# Patient Record
Sex: FEMALE | Race: BLACK OR AFRICAN AMERICAN | Employment: FULL TIME | URBAN - METROPOLITAN AREA
[De-identification: names, ages, dates, MRNs, and addresses within clinical notes are randomized per-mention and may not be internally consistent; named-entity substitution may affect disease eponyms.]

---

## 2017-02-21 ENCOUNTER — OFFICE VISIT (OUTPATIENT)
Dept: FAMILY MEDICINE CLINIC | Age: 57
End: 2017-02-21

## 2017-02-21 VITALS
SYSTOLIC BLOOD PRESSURE: 124 MMHG | HEIGHT: 65 IN | HEART RATE: 72 BPM | WEIGHT: 184 LBS | RESPIRATION RATE: 16 BRPM | OXYGEN SATURATION: 97 % | BODY MASS INDEX: 30.66 KG/M2 | TEMPERATURE: 98 F | DIASTOLIC BLOOD PRESSURE: 84 MMHG

## 2017-02-21 DIAGNOSIS — S20.212A CHEST WALL CONTUSION, LEFT, INITIAL ENCOUNTER: Primary | ICD-10-CM
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
## 2017-02-21 NOTE — MR AVS SNAPSHOT
Visit Information Date & Time Provider Department Dept. Phone Encounter #  
 2/21/2017  8:45 AM Joel Sampson MD [facility] [phone] [id number] Follow-up Instructions Return if symptoms worsen or fail to improve. Upcoming Health Maintenance Date Due  
 PAP AKA CERVICAL CYTOLOGY 6/11/2017 DTaP/Tdap/Td series (1 - Tdap) 5/22/2017* BREAST CANCER SCRN MAMMOGRAM 5/22/2017* COLONOSCOPY 7/26/2022 *Topic was postponed. The date shown is not the original due date. Allergies as of 2/21/2017  Review Complete On: 2/21/2017 By: Tata Escobar RN Severity Noted Reaction Type Reactions Enalapril  04/05/2012    Cough Procardia [Nifedipine]  04/05/2012    Other (comments)  
 headache Current Immunizations  Reviewed on 4/4/2012 Name Date  
 TD Vaccine 5/19/2008 Not reviewed this visit You Were Diagnosed With   
  
 Codes Comments Chest wall contusion, left, initial encounter    -  Primary ICD-10-CM: B03.920T ICD-9-CM: 922.1 Vitals BP Pulse Temp Resp Height(growth percentile) Weight(growth percentile) 124/84 (BP 1 Location: Left arm, BP Patient Position: Sitting) 72 98 °F (36.7 °C) (Oral) 16 5' 4.57\" (1.64 m) 184 lb (83.5 kg) SpO2 BMI OB Status Smoking Status 97% 31.03 kg/m2 Hysterectomy Never Smoker Vitals History BMI and BSA Data Body Mass Index Body Surface Area 31.03 kg/m 2 1.95 m 2 Preferred Pharmacy Pharmacy Name Phone [id number] [place] [phone] Your Updated Medication List  
  
   
This list is accurate as of: 2/21/17  9:06 AM.  Always use your most recent med list.  
  
  
  
  
 atorvastatin 20 mg tablet Commonly known as:  LIPITOR  
TAKE 1 TAB BY MOUTH DAILY. fish oil-omega-3 fatty acids 340-1,000 mg capsule Take 1 Cap by mouth daily. losartan-hydroCHLOROthiazide 50-12.5 mg per tablet Commonly known as:  HYZAAR  
TAKE 1 TABLET BY MOUTH EVERY DAY  
  
 MULTIPLE VITAMIN PO Take  by mouth. Follow-up Instructions Return if symptoms worsen or fail to improve. Introducing [facility] SERVICES! [place] introduces She[employer]ertz patient portal. Now you can access parts of your medical record, email your doctor's office, and request medication refills online. 1. In your internet browser, go to [email or web address]://[email or web address] 2. Click on the First Time User? Click Here link in the Sign In box. You will see the New Member Sign Up page. 3. Enter your ShepHertz Access Code exactly as it appears below. You will not need to use this code after youve completed the sign-up process. If you do not sign up before the expiration date, you must request a new code. · ShepHertz Access Code: [email or web address] Expires: 5/22/2017  9:06 AM 
 
4. Enter the last four digits of your Social Security Number (xxxx) and Date of Birth (mm/dd/yyyy) as indicated and click Submit. You will be taken to the next sign-up page. 5. Create a ShepHertz ID. This will be your ShepHertz login ID and cannot be changed, so think of one that is secure and easy to remember. 6. Create a ShepHertz password. You can change your password at any time. 7. Enter your Password Reset Question and Answer. This can be used at a later time if you forget your password. 8. Enter your e-mail address. You will receive e-mail notification when new information is available in [address] 9. Click Sign Up. You can now view and download portions of your medical record. 10. Click the Download Summary menu link to download a portable copy of your medical information. If you have questions, please visit the Frequently Asked Questions section of the ShepHertz website. Remember, ShepHertz is NOT to be used for urgent needs. For medical emergencies, dial 911. Now available from your iPhone and Android! Please provide this summary of care documentation to your next provider. Your primary care clinician is listed as [id number] If you have any questions after today's visit, please call [phone].

## 2017-03-15 DIAGNOSIS — I10 ESSENTIAL HYPERTENSION WITH GOAL BLOOD PRESSURE LESS THAN 140/90: ICD-10-CM

## 2017-03-15 RX ORDER — LOSARTAN POTASSIUM AND HYDROCHLOROTHIAZIDE 12.5; 5 MG/1; MG/1
TABLET ORAL
Qty: 90 TAB | Refills: 1 | Status: SHIPPED | OUTPATIENT
Start: 2017-03-15 | End: 2017-09-12 | Stop reason: SDUPTHER

## 2017-03-15 NOTE — TELEPHONE ENCOUNTER
She was in the office for wellness visit in June and her lab is current until September.  She can set her annual visit in August and get everything back on same schedule

## 2017-04-19 ENCOUNTER — TELEPHONE (OUTPATIENT)
Dept: FAMILY MEDICINE CLINIC | Age: 57
End: 2017-04-19

## 2017-04-19 NOTE — TELEPHONE ENCOUNTER
Patient sent in a fax today with a note that said she had been feeling a little dizzy with a headache yesterday while in a meeting. She had the nurse at work check her blood sugar and blood pressure  Blood sugar was 129 and her blood pressure was 99/67 yesterday and 123/78 today. She is concerned about the fluctuation in the readings.

## 2017-04-19 NOTE — TELEPHONE ENCOUNTER
Spoke with ID verified patient and she will monitor. She has been having headaches this week-not sure if allergy related. Will set up appt if not quickly better.  Advised Excedrin Migraine for the headaches or the components as needed

## 2017-05-10 RX ORDER — ATORVASTATIN CALCIUM 20 MG/1
TABLET, FILM COATED ORAL
Qty: 90 TAB | Refills: 1 | Status: SHIPPED | OUTPATIENT
Start: 2017-05-10 | End: 2017-11-10 | Stop reason: SDUPTHER

## 2017-05-10 NOTE — TELEPHONE ENCOUNTER
Her lipids are current as of September- she is due in for her annual [facility] but it's ok to wait and do everything at one visit in [place] was in for acute visit end of Feb

## 2017-09-12 DIAGNOSIS — I10 ESSENTIAL HYPERTENSION WITH GOAL BLOOD PRESSURE LESS THAN 140/90: ICD-10-CM

## 2017-09-12 RX ORDER — LOSARTAN POTASSIUM AND HYDROCHLOROTHIAZIDE 12.5; 5 MG/1; MG/1
TABLET ORAL
Qty: 90 TAB | Refills: 0 | Status: SHIPPED | OUTPATIENT
Start: 2017-09-12 | End: 2017-12-12 | Stop reason: SDUPTHER

## 2017-09-20 ENCOUNTER — OFFICE VISIT (OUTPATIENT)
Dept: FAMILY MEDICINE CLINIC | Age: 57
End: 2017-09-20

## 2017-09-20 VITALS
HEART RATE: 84 BPM | HEIGHT: 65 IN | TEMPERATURE: 98 F | BODY MASS INDEX: 31.21 KG/M2 | DIASTOLIC BLOOD PRESSURE: 75 MMHG | SYSTOLIC BLOOD PRESSURE: 128 MMHG | OXYGEN SATURATION: 95 % | WEIGHT: 187.3 LBS | RESPIRATION RATE: 16 BRPM

## 2017-09-20 DIAGNOSIS — Z00.00 LABORATORY EXAM ORDERED AS PART OF ROUTINE GENERAL MEDICAL EXAMINATION: ICD-10-CM

## 2017-09-20 DIAGNOSIS — I10 ESSENTIAL HYPERTENSION: ICD-10-CM

## 2017-09-20 DIAGNOSIS — E78.00 HYPERCHOLESTEROLEMIA: ICD-10-CM

## 2017-09-20 DIAGNOSIS — Z00.00 PHYSICAL EXAM: Primary | ICD-10-CM

## 2017-09-20 DIAGNOSIS — E55.9 VITAMIN D DEFICIENCY: ICD-10-CM

## 2017-09-20 NOTE — MR AVS SNAPSHOT
Visit Information Date & Time Provider Department Dept. Phone Encounter #  
 9/20/2017 10:00 AM Ced Colón MD [facility] [phone] [id number] Follow-up Instructions Return in about 1 year (around 9/20/2018) for [facility], labs. Upcoming Health Maintenance Date Due  
 BREAST CANCER SCRN MAMMOGRAM 12/19/2017* PAP AKA CERVICAL CYTOLOGY 12/19/2017* DTaP/Tdap/Td series (1 - Tdap) 5/19/2018* COLONOSCOPY 7/26/2022 *Topic was postponed. The date shown is not the original due date. Allergies as of 9/20/2017  Review Complete On: 9/20/2017 By: Ced Colón MD  
  
 Severity Noted Reaction Type Reactions Enalapril  04/05/2012    Cough Procardia [Nifedipine]  04/05/2012    Other (comments)  
 headache Current Immunizations  Reviewed on 9/20/2017 Name Date  
 TD Vaccine 5/19/2008 Reviewed by Ady Upton RN on 9/20/2017 at 10:05 AM  
You Were Diagnosed With   
  
 Codes Comments Physical exam    -  Primary ICD-10-CM: Z00.00 ICD-9-CM: V70.9 Hypercholesterolemia     ICD-10-CM: E78.00 ICD-9-CM: 272.0 Vitamin D deficiency     ICD-10-CM: E55.9 ICD-9-CM: 268.9 Essential hypertension     ICD-10-CM: I10 
ICD-9-CM: 401.9 Laboratory exam ordered as part of routine general medical examination     ICD-10-CM: Z00.00 ICD-9-CM: V72.62 Vitals BP Pulse Temp Resp Height(growth percentile) Weight(growth percentile) 128/75 (BP 1 Location: Left arm, BP Patient Position: Sitting) 84 98 °F (36.7 °C) (Oral) 16 5' 4.75\" (1.645 m) 187 lb 4.8 oz (85 kg) SpO2 BMI OB Status Smoking Status 95% 31.41 kg/m2 Hysterectomy Never Smoker Vitals History BMI and BSA Data Body Mass Index Body Surface Area  
 31.41 kg/m 2 1.97 m 2 Preferred Pharmacy Pharmacy Name Phone [id number] [address] [phone] Your Updated Medication List  
  
   
 This list is accurate as of: 9/20/17 10:34 AM.  Always use your most recent med list.  
  
  
  
  
 atorvastatin 20 mg tablet Commonly known as:  LIPITOR  
TAKE 1 TABLET BY MOUTH DAILY  
  
 fish oil-omega-3 fatty acids 340-1,000 mg capsule Take 1 Cap by mouth daily. losartan-hydroCHLOROthiazide 50-12.5 mg per tablet Commonly known as:  HYZAAR  
TAKE 1 TABLET BY MOUTH EVERY DAY  
  
 MULTIPLE VITAMIN PO Take  by mouth. We Performed the Following CBC WITH AUTOMATED DIFF [71491 CPT(R)] LIPID PANEL [66749 CPT(R)] METABOLIC PANEL, COMPREHENSIVE [32690 CPT(R)] TSH 3RD GENERATION [25065 CPT(R)] URINALYSIS W/ RFLX MICROSCOPIC [87813 CPT(R)] VITAMIN D, 25 HYDROXY [id number] CPT(R)] Follow-up Instructions Return in about 1 year (around 9/20/2018) for [facility], [facility]. Introducing [facility] HE[facility] SERVICES! [facility] introduces Fannabee patient portal. Now you can access parts of your medical record, email your doctor's office, and request medication refills online. 1. In your internet browser, go to [email or web address] 2. Click on the First Time User? Click Here link in the Sign In box. You will see the New Member Sign Up page. 3. Enter your Fannabee Access Code exactly as it appears below. You will not need to use this code after youve completed the sign-up process. If you do not sign up before the expiration date, you must request a new code. · Fannabee Access Code: [email or web address] Expires: 12/19/2017 10:34 AM 
 
4. Enter the last four digits of your Social Security Number (xxxx) and Date of Birth (mm/dd/yyyy) as indicated and click Submit. You will be taken to the next sign-up page. 5. Create a Virallyt ID. This will be your Fannabee login ID and cannot be changed, so think of one that is secure and easy to remember. 6. Create a Fannabee password. You can change your password at any time. 7. Enter your Password Reset Question and Answer. This can be used at a later time if you forget your password. 8. Enter your e-mail address. You will receive e-mail notification when new information is available in [address] 9. Click Sign Up. You can now view and download portions of your medical record. 10. Click the Download Summary menu link to download a portable copy of your medical information. If you have questions, please visit the Frequently Asked Questions section of the [employer] website. Remember, [employer] is NOT to be used for urgent needs. For medical emergencies, dial 911. Now available from your iPhone and Android! Please provide this summary of care documentation to your next provider. Your primary care clinician is listed as [phone] If you have any questions after today's visit, please call [phone].

## 2017-09-20 NOTE — PROGRESS NOTES
Chief Complaint   Patient presents with    Complete Physical    Labs     Fasting   Lipitor was changed to every other day due to Leg pain. Pain has improved since changing med. 1. Have you been to the ER, urgent care clinic since your last visit? Hospitalized since your last visit? No    2. Have you seen or consulted any other health care providers outside of the [address] [place] [address] since your last visit? Include any pap smears or colon screening. No    The patient was counseled on the dangers of tobacco use, and Patient is a non smoker. Reviewed strategies to maximize success, including Continue not to smoke. I have reviewed Health Maintenance with the patient and updated. Patient has an Advance Directive. Complete Physical Exam Female  Pre-Visit Questions:    1. Do you follow a low fat or low salt diet ? y  2. Do you follow an exercise program? y  3. Have you had your tetanus booster in the last 10 years? y  3. Have you ever had a Pneumonia vaccine? n  5. Do you smoke? n  6. Do you consider yourself overweight? y  7. Do you perform Breast self exam?n  8. Is there a family history of CAD< age 48? n  5. Is there a family history of Cancer? y  8. Do you have any Cancer risks? n  11. Have you had a colonoscopy? y  15. Have you been to your eye doctor past year?  y   15. Have you been to your dentist in the last 6 months?  y  15. Have you had your flu shot for this season?  n  13. Have you had a Pap smear in the last 3 years?y  16. Have you had your annual mammogram?y  17.   Have you had a bone density scan(DEXA)?y

## 2017-09-20 NOTE — PROGRESS NOTES
HISTORY OF PRESENT ILLNESS  Brian More is a 62 y.o. female. HPI   Here for [facility]. Eye doctor past yr. Dentist 2 x annually. Colonoscopy '12.  10 yr repeat. Gyn and mammo annually. DEXA 3 yrs ago. No signif hx past yr. Patient Active Problem List   Diagnosis Code    Essential hypertension I10    Vitamin D deficiency E55.9    Hypercholesterolemia E78.00     Current Outpatient Prescriptions   Medication Sig Dispense Refill    losartan-hydroCHLOROthiazide (HYZAAR) 50-12.5 mg per tablet TAKE 1 TABLET BY MOUTH EVERY DAY 90 Tab 0    atorvastatin (LIPITOR) 20 mg tablet TAKE 1 TABLET BY MOUTH DAILY 90 Tab 1    fish oil-omega-3 fatty acids 340-1,000 mg capsule Take 1 Cap by mouth daily.  MULTIVITAMIN (MULTIPLE VITAMIN PO) Take  by mouth.          Allergies   Allergen Reactions    Enalapril Cough    Procardia [Nifedipine] Other (comments)     headache     Past Medical History:   Diagnosis Date    Acid reflux     Advance directive discussed with patient 05/18/2015    Anemia NEC     Back pain 9/15/15    [place] [facility] notes    Cyst     History of chicken pox     Hypercholesterolemia     Hypertension     no treated at this time    Redundant colon 5/2012    on scope sameer frances    Screening for HPV (human papillomavirus) 1/16/15    Dr Josh Flores positive PAP repeat due 1/2016     Past Surgical History:   Procedure Laterality Date    CT HEART W/O CONT WITH CALCIUM  5/2008    zero    HX ADENOIDECTOMY      HX HYSTERECTOMY      Hyst for DUB    HX TONSILLECTOMY      t and a    ID COLONOSCOPY FLX DX W/COLLJ SPEC WHEN PFRMD  5/7/2012     with barium enema follow up redundant     Family History   Problem Relation Age of Onset    Cancer Father      lung    Hypertension Father     Elevated Lipids Father     Cancer Paternal Uncle      colon    Heart Disease Maternal Grandmother     Cancer Paternal Grandfather      lung    Cancer Paternal Uncle      colon    Heart Disease Mother     Hypertension Mother     Elevated Lipids Mother     Diabetes Sister     Hypertension Sister     Hypertension Brother     Elevated Lipids Brother     Cancer Paternal Aunt      stomach    Cancer Paternal Aunt      stomach    Cancer Paternal Aunt      stomach    Cancer Paternal Aunt      stomach    Lung Disease Paternal Uncle     Lung Disease Paternal Uncle     Other Sister      Social History   Substance Use Topics    Smoking status: Never Smoker    Smokeless tobacco: Never Used    Alcohol use No      Lab Results  Component Value Date/Time   WBC 5.8 09/07/2016 08:24 AM   HGB 11.9 09/07/2016 08:24 AM   HCT 36.7 09/07/2016 08:24 AM   PLATELET 175 04/29/7543 08:24 AM   MCV 82 09/07/2016 08:24 AM     Lab Results  Component Value Date/Time   Glucose 94 09/07/2016 08:24 AM   LDL, calculated 84 09/07/2016 08:24 AM   Creatinine 0.72 09/07/2016 08:24 AM      Lab Results  Component Value Date/Time   Cholesterol, total 148 09/07/2016 08:24 AM   HDL Cholesterol 48 09/07/2016 08:24 AM   LDL, calculated 84 09/07/2016 08:24 AM   Triglyceride 82 09/07/2016 08:24 AM   CHOL/HDL Ratio 4.5 07/09/2009 08:24 AM   Lab Results  Component Value Date/Time   ALT (SGPT) 32 09/07/2016 08:24 AM   AST (SGOT) 30 09/07/2016 08:24 AM   Alk.  phosphatase 91 09/07/2016 08:24 AM   Bilirubin, total 0.3 09/07/2016 08:24 AM   Albumin 4.2 09/07/2016 08:24 AM   Protein, total 7.2 09/07/2016 08:24 AM   PLATELET 403 33/33/0015 08:24 AM       Lab Results  Component Value Date/Time   GFR est non-AA 94 09/07/2016 08:24 AM   GFR est  09/07/2016 08:24 AM   Creatinine 0.72 09/07/2016 08:24 AM   BUN 15 09/07/2016 08:24 AM   Sodium 141 09/07/2016 08:24 AM   Potassium 4.0 09/07/2016 08:24 AM   Chloride 100 09/07/2016 08:24 AM   CO2 26 09/07/2016 08:24 AM   Lab Results  Component Value Date/Time   TSH 1.210 09/07/2016 08:24 AM      Lab Results   Component Value Date/Time    Glucose 94 09/07/2016 08:24 AM      Fasting for labs     Review of Systems   Constitutional: Negative. HENT: Negative. Eyes: Negative. Respiratory: Negative. Cardiovascular: Negative. Gastrointestinal: Negative. Genitourinary: Negative. Musculoskeletal: Positive for myalgias. Skin: Negative. Neurological: Negative. Endo/Heme/Allergies: Negative. Psychiatric/Behavioral: Negative. Physical Exam   Vitals:    09/20/17 0958   BP: 128/75   Pulse: 84   Resp: 16   Temp: 98 °F (36.7 °C)   TempSrc: Oral   SpO2: 95%   Weight: 187 lb 4.8 oz (85 kg)   Height: 5' 4.75\" (1.645 m)       General    alert, cooperative, no distress, appears stated age   Head    Normocephalic, without obvious abnormality, atraumatic   Eyes    conjunctivae/corneas clear. PERRL. Fundi benign   Ears    Canals and TMs clear   Nose Passages patent. Mucosa normal. No drainage or sinus tenderness. Throat Lips, mucosa, and tongue normal. Teeth and gums normal.  Post pharynx neg. Neck supple, nontender, no adenopathy, thyroid: not enlarged, no masses/nodules, no carotid bruits   Back     symmetric, no curvature. FROM. No CVA tenderness   Lungs     clear to auscultation bilaterally   Breasts    Declined   Heart    Regular rate and rhythm, with no murmur, click, rub or gallop   Abdomen   Soft, non-tender. Bowel sounds normal. No masses,  No organomegaly   Genitalia and Rectal  Deferred. Per GYN. Extremities   extremities normal, atraumatic, no cyanosis or edema   Pulses   1-2+ and symmetric except dec pedals. Skin No rashes or lesions       Neurologic Romberg neg, nml heel, toe and Tandem gait. DTRs 1-2+ symmetric         ASSESSMENT and PLAN    ICD-10-CM ICD-9-CM    1. Physical exam Z00.00 V70.9 VITAMIN D, 25 HYDROXY      CBC WITH AUTOMATED DIFF      URINALYSIS W/ RFLX MICROSCOPIC      TSH 3RD GENERATION      METABOLIC PANEL, COMPREHENSIVE      LIPID PANEL   2.  Hypercholesterolemia E78.00 272.0 LIPID PANEL   3. Vitamin D deficiency E55.9 268.9 VITAMIN D, 25 HYDROXY   4. Essential hypertension I10 401.9 CBC WITH AUTOMATED DIFF      URINALYSIS W/ RFLX MICROSCOPIC      TSH 3RD GENERATION      METABOLIC PANEL, COMPREHENSIVE      LIPID PANEL   5. Laboratory exam ordered as part of routine general medical examination Z00.00 V72.62 VITAMIN D, 25 HYDROXY      CBC WITH AUTOMATED DIFF      URINALYSIS W/ RFLX MICROSCOPIC      TSH 3RD GENERATION      METABOLIC PANEL, COMPREHENSIVE      LIPID PANEL     Follow-up Disposition: Not on File

## 2017-09-21 LAB
25(OH)D3+25(OH)D2 SERPL-MCNC: 25.9 NG/ML (ref 30–100)
ALBUMIN SERPL-MCNC: 4.4 G/DL (ref 3.5–5.5)
ALBUMIN/GLOB SERPL: 1.3 {RATIO} (ref 1.2–2.2)
ALP SERPL-CCNC: 89 IU/L (ref 39–117)
ALT SERPL-CCNC: 34 IU/L (ref 0–32)
APPEARANCE UR: ABNORMAL
AST SERPL-CCNC: 30 IU/L (ref 0–40)
BACTERIA #/AREA URNS HPF: ABNORMAL /[HPF]
BASOPHILS # BLD AUTO: 0 X10E3/UL (ref 0–0.2)
BASOPHILS NFR BLD AUTO: 0 %
BILIRUB SERPL-MCNC: 0.4 MG/DL (ref 0–1.2)
BILIRUB UR QL STRIP: NEGATIVE
BUN SERPL-MCNC: 11 MG/DL (ref 6–24)
BUN/CREAT SERPL: 15 (ref 9–23)
CALCIUM SERPL-MCNC: 9.6 MG/DL (ref 8.7–10.2)
CASTS URNS QL MICRO: ABNORMAL /LPF
CHLORIDE SERPL-SCNC: 99 MMOL/L (ref 96–106)
CHOLEST SERPL-MCNC: 274 MG/DL (ref 100–199)
CO2 SERPL-SCNC: 26 MMOL/L (ref 18–29)
COLOR UR: YELLOW
COMMENT, 011824: ABNORMAL
CREAT SERPL-MCNC: 0.73 MG/DL (ref 0.57–1)
EOSINOPHIL # BLD AUTO: 0.2 X10E3/UL (ref 0–0.4)
EOSINOPHIL NFR BLD AUTO: 3 %
EPI CELLS #/AREA URNS HPF: ABNORMAL /HPF
ERYTHROCYTE [DISTWIDTH] IN BLOOD BY AUTOMATED COUNT: 14.4 % (ref 12.3–15.4)
GLOBULIN SER CALC-MCNC: 3.3 G/DL (ref 1.5–4.5)
GLUCOSE SERPL-MCNC: 86 MG/DL (ref 65–99)
GLUCOSE UR QL: NEGATIVE
HCT VFR BLD AUTO: 37.7 % (ref 34–46.6)
HDLC SERPL-MCNC: 48 MG/DL
HGB BLD-MCNC: 12.6 G/DL (ref 11.1–15.9)
HGB UR QL STRIP: ABNORMAL
IMM GRANULOCYTES # BLD: 0 X10E3/UL (ref 0–0.1)
IMM GRANULOCYTES NFR BLD: 0 %
INTERPRETATION, 910389: NORMAL
KETONES UR QL STRIP: NEGATIVE
LDLC SERPL CALC-MCNC: 204 MG/DL (ref 0–99)
LEUKOCYTE ESTERASE UR QL STRIP: ABNORMAL
LYMPHOCYTES # BLD AUTO: 2.6 X10E3/UL (ref 0.7–3.1)
LYMPHOCYTES NFR BLD AUTO: 43 %
MCH RBC QN AUTO: 27.3 PG (ref 26.6–33)
MCHC RBC AUTO-ENTMCNC: 33.4 G/DL (ref 31.5–35.7)
MCV RBC AUTO: 82 FL (ref 79–97)
MICRO URNS: ABNORMAL
MONOCYTES # BLD AUTO: 0.4 X10E3/UL (ref 0.1–0.9)
MONOCYTES NFR BLD AUTO: 7 %
MUCOUS THREADS URNS QL MICRO: PRESENT
NEUTROPHILS # BLD AUTO: 2.8 X10E3/UL (ref 1.4–7)
NEUTROPHILS NFR BLD AUTO: 47 %
NITRITE UR QL STRIP: NEGATIVE
PH UR STRIP: 6 [PH] (ref 5–7.5)
PLATELET # BLD AUTO: 268 X10E3/UL (ref 150–379)
POTASSIUM SERPL-SCNC: 4 MMOL/L (ref 3.5–5.2)
PROT SERPL-MCNC: 7.7 G/DL (ref 6–8.5)
PROT UR QL STRIP: NEGATIVE
RBC # BLD AUTO: 4.61 X10E6/UL (ref 3.77–5.28)
RBC #/AREA URNS HPF: ABNORMAL /HPF
SODIUM SERPL-SCNC: 141 MMOL/L (ref 134–144)
SP GR UR: 1.01 (ref 1–1.03)
TRIGL SERPL-MCNC: 108 MG/DL (ref 0–149)
TSH SERPL DL<=0.005 MIU/L-ACNC: 1.09 UIU/ML (ref 0.45–4.5)
UROBILINOGEN UR STRIP-MCNC: 0.2 MG/DL (ref 0.2–1)
VLDLC SERPL CALC-MCNC: 22 MG/DL (ref 5–40)
WBC # BLD AUTO: 6 X10E3/UL (ref 3.4–10.8)
WBC #/AREA URNS HPF: >30 /HPF

## 2017-10-02 NOTE — PROGRESS NOTES
Low Vit. D. Take OTC supp 9867-4115 units daily. Lipids back up. ?off statin. Pyuria. Return for CC UC if having bladder sxs.   Rest labs O.K.

## 2017-10-04 NOTE — PROGRESS NOTES
Left voice mail message for patient to return call. No PHI left on message. Upon call back patient will be notified of Dr. Geanie Runner recommendations: Low Vit. D. Take over the counter supplement 4719-9272 units daily. Lipids are back up. Have you been off statin? Pyuria. Return for clean catch urine culture if having bladder symptoms.   Rest labs O.K.

## 2017-10-05 NOTE — PROGRESS NOTES
Spoke with patient using 2 identifiers, name and . Patient informed per Dr. Raimundo Becerril recommendations:  Low Vit. D. Take over the counter supplement 5537-9896 units daily. Lipids are back up. Have you been off statin? Pyuria. Return for clean catch urine culture if having bladder symptoms. Rest labs O.K. Patient reports no pain or burning on urination and does not feel any other bladder symptoms. at this time. Patient also reports only taking statin medication every other day \"because it makes my legs hurt\"  and states she will begin taking daily as directed. Writer encouraged patient to notify office and schedule appointment for follow up if pain in legs returns when daily administration begins. Patient offered opportunity to ask questions. Declined. Patient verbalized understanding.

## 2018-01-26 ENCOUNTER — TELEPHONE (OUTPATIENT)
Dept: FAMILY MEDICINE CLINIC | Age: 58
End: 2018-01-26

## 2018-01-26 NOTE — TELEPHONE ENCOUNTER
----- Message from P[employer]O [employer]let sent at 1/26/2018  1:28 PM EST -----  Regarding: Dr. Pat Gonzalez  Patient is still having pain in her left foot. Her number is [phone].

## 2018-02-16 ENCOUNTER — TELEPHONE (OUTPATIENT)
Dept: FAMILY MEDICINE CLINIC | Age: 58
End: 2018-02-16

## 2018-02-16 NOTE — TELEPHONE ENCOUNTER
Spoke with patient after obtaining 2 patient identifiers  Per patient she will wait until Dr. Valeriy Ellis comes back to discuss the pain in side of left foot. Writer offered to give message to on call provider or one that's in the office right now. Per patient foot has been having discomfort for awhile and she can wait. Verbalized understanding that writer could get her problem addressed today and refused. No further questions or concerns noted.

## 2018-02-16 NOTE — TELEPHONE ENCOUNTER
----- Message from Taylor Cai sent at 2/16/2018 12:49 PM EST -----  Regarding: Dr. Cruz Fill request for Kati Velasco to call her in reference to pain in left foot, best contact number [phone].

## 2018-02-20 NOTE — TELEPHONE ENCOUNTER
Writer placed call to patient to set up an office visit to get her foot pain evaluated. No answer. VM left to call facility back.

## 2018-03-22 ENCOUNTER — OFFICE VISIT (OUTPATIENT)
Dept: FAMILY MEDICINE CLINIC | Age: 58
End: 2018-03-22

## 2018-03-22 VITALS
HEART RATE: 100 BPM | TEMPERATURE: 97.8 F | RESPIRATION RATE: 16 BRPM | DIASTOLIC BLOOD PRESSURE: 73 MMHG | SYSTOLIC BLOOD PRESSURE: 116 MMHG | BODY MASS INDEX: 30.31 KG/M2 | OXYGEN SATURATION: 93 % | WEIGHT: 181.9 LBS | HEIGHT: 65 IN

## 2018-03-22 DIAGNOSIS — M72.2 PLANTAR FASCIITIS, LEFT: Primary | ICD-10-CM

## 2018-03-22 NOTE — PROGRESS NOTES
Sxs over 6 mos off and on. Worse at end of day. Better on weekends with flat shoes. Denies swelling. Tenderness around heel area of left foot. Visit Vitals    /73 (BP 1 Location: Left arm, BP Patient Position: Sitting)    Pulse 100    Temp 97.8 °F (36.6 °C) (Oral)    Resp 16    Ht 5' 4.75\" (1.645 m)    Wt 181 lb 14.4 oz (82.5 kg)    SpO2 93%    BMI 30.5 kg/m2       Patient alert and cooperative. Left foot with no swelling, warmth, erythema. Full plantar dorsiflexion, internal and external rotation, negative drawer. Palpation tenderness overlying the plantar fascia insertion onto the calcaneus medially. Assessment:  1. Plantar fasciitis. Plan:  1. Given stretching exercise sheets. 2. Moist heat, ice, massage, antiinflammatories if needed. 3. Arch supports. 4. Follow otherwise here prn.

## 2018-03-22 NOTE — MR AVS SNAPSHOT
[address] 
[address] [place] [address] 
[phone] Patient: Arianne Brunson MRN:  [id number] Visit Information Date & Time Provider Department Dept. Phone Encounter #  
 3/22/2018  4:40 PM Jojo Newman MD [facility] [phone] [id number] Follow-up Instructions Return if symptoms worsen or fail to improve. Routing History Upcoming Health Maintenance Date Due  
 PAP AKA CERVICAL CYTOLOGY 6/11/2017 BREAST CANCER SCRN MAMMOGRAM 10/19/2017 DTaP/Tdap/Td series (1 - Tdap) 5/19/2018* COLONOSCOPY 7/26/2022 *Topic was postponed. The date shown is not the original due date. Allergies as of 3/22/2018  Review Complete On: 3/22/2018 By: Lesvia Mendieta RN Severity Noted Reaction Type Reactions Enalapril  04/05/2012    Cough Procardia [Nifedipine]  04/05/2012    Other (comments)  
 headache Current Immunizations  Reviewed on 9/20/2017 Name Date  
 TD Vaccine 5/19/2008 Not reviewed this visit You Were Diagnosed With   
  
 Codes Comments Plantar fasciitis, left    -  Primary ICD-10-CM: M72.2 ICD-9-CM: 728.71 Vitals BP Pulse Temp Resp Height(growth percentile) Weight(growth percentile) 116/73 (BP 1 Location: Left arm, BP Patient Position: Sitting) 100 97.8 °F (36.6 °C) (Oral) 16 5' 4.75\" (1.645 m) 181 lb 14.4 oz (82.5 kg) SpO2 BMI OB Status Smoking Status 93% 30.5 kg/m2 Hysterectomy Never Smoker Vitals History BMI and BSA Data Body Mass Index Body Surface Area 30.5 kg/m 2 1.94 m 2 Preferred Pharmacy Pharmacy Name Phone [id number] [address] [phone] Your Updated Medication List  
  
   
This list is accurate as of 3/22/18  5:05 PM.  Always use your most recent med list.  
  
  
  
  
 atorvastatin 20 mg tablet Commonly known as:  LIPITOR TAKE 1 TABLET BY MOUTH DAILY  
  
 fish oil-omega-3 fatty acids 340-1,000 mg capsule Take 1 Cap by mouth daily. losartan-hydroCHLOROthiazide 50-12.5 mg per tablet Commonly known as:  HYZAAR  
TAKE 1 TABLET BY MOUTH EVERY DAY  
  
 OTHER  
1 Cap daily. B-Complex #12 OTHER Vit D 3 2000 iu daily Follow-up Instructions Return if symptoms worsen or fail to improve. Introducing [facility]! Lupe Marquez introduces [employer] patient portal. Now you can access parts of your medical record, email your doctor's office, and request medication refills online. 1. In your internet browser, go to [email or web address] 2. Click on the First Time User? Click Here link in the Sign In box. You will see the New Member Sign Up page. 3. Enter your [employer] Access Code exactly as it appears below. You will not need to use this code after youve completed the sign-up process. If you do not sign up before the expiration date, you must request a new code. · [employer] Access Code: [email or web address] Expires: 6/20/2018  5:05 PM 
 
4. Enter the last four digits of your Social Security Number (xxxx) and Date of Birth (mm/dd/yyyy) as indicated and click Submit. You will be taken to the next sign-up page. 5. Create a [employer] ID. This will be your [employer] login ID and cannot be changed, so think of one that is secure and easy to remember. 6. Create a [employer] password. You can change your password at any time. 7. Enter your Password Reset Question and Answer. This can be used at a later time if you forget your password. 8. Enter your e-mail address. You will receive e-mail notification when new information is available in [address] 9. Click Sign Up. You can now view and download portions of your medical record. 10. Click the Download Summary menu link to download a portable copy of your medical information. If you have questions, please visit the Frequently Asked Questions section of the [employer] website. Remember, Shut Down is NOT to be used for urgent needs. For medical emergencies, dial 911. Now available from your iPhone and Android! Please provide this summary of care documentation to your next provider. Your primary care clinician is listed as Raina Andujar Dr. If you have any questions after today's visit, please call [phone].

## 2018-03-22 NOTE — PROGRESS NOTES
Benito Servin  Identified pt with two pt identifiers(name and ). Chief Complaint   Patient presents with    Foot Pain     Left foot off and on for a month. No injury. 1. Have you been to the ER, urgent care clinic since your last visit? Hospitalized since your last visit? NO    2. Have you seen or consulted any other health care providers outside of the [address] [place] [address] since your last visit? Include any pap smears or colon screening. NO    Today's provider has been notified of reason for visit, vitals and flowsheets obtained on patients.      Patient received paperwork for advance directive during previous visit but has not completed at this time     Reviewed record In preparation for visit, huddled with provider and have obtained necessary documentation      Health Maintenance Due   Topic    PAP AKA CERVICAL CYTOLOGY     BREAST CANCER SCRN MAMMOGRAM        Wt Readings from Last 3 Encounters:   18 181 lb 14.4 oz (82.5 kg)   17 187 lb 4.8 oz (85 kg)   17 184 lb (83.5 kg)     Temp Readings from Last 3 Encounters:   17 98 °F (36.7 °C) (Oral)   17 98 °F (36.7 °C) (Oral)   06/15/16 97.2 °F (36.2 °C) (Oral)     BP Readings from Last 3 Encounters:   17 128/75   17 124/84   06/15/16 105/69     Pulse Readings from Last 3 Encounters:   17 84   17 72   06/15/16 (!) 103     Vitals:    18 1640   Weight: 181 lb 14.4 oz (82.5 kg)   Height: 5' 4.75\" (1.645 m)   PainSc:   4   PainLoc: Foot         Learning Assessment:  :     Learning Assessment 10/29/2014   PRIMARY LEARNER Patient   PRIMARY LANGUAGE ENGLISH   LEARNER PREFERENCE PRIMARY VIDEOS   ANSWERED BY patient   RELATIONSHIP SELF       Depression Screening:  :     PHQ over the last two weeks 2017   Little interest or pleasure in doing things Not at all   Feeling down, depressed or hopeless Not at all   Total Score PHQ 2 0       Fall Risk Assessment:  :     No flowsheet data found.    Abuse Screening:  :     Abuse Screening Questionnaire 3/22/2018   Do you ever feel afraid of your partner? N   Are you in a relationship with someone who physically or mentally threatens you? N   Is it safe for you to go home? Y       ADL Screening:  :     ADL Assessment 3/22/2018   Feeding yourself No Help Needed   Getting from bed to chair No Help Needed   Getting dressed No Help Needed   Bathing or showering No Help Needed   Walk across the room (includes cane/walker) No Help Needed   Using the telphone No Help Needed   Taking your medications No Help Needed   Preparing meals No Help Needed   Managing money (expenses/bills) No Help Needed   Moderately strenuous housework (laundry) No Help Needed   Shopping for personal items (toiletries/medicines) No Help Needed   Shopping for groceries No Help Needed   Driving No Help Needed   Climbing a flight of stairs No Help Needed   Getting to places beyond walking distances No Help Needed                 Medication reconciliation up to date and corrected with patient at this time.

## 2018-10-29 ENCOUNTER — OFFICE VISIT (OUTPATIENT)
Dept: FAMILY MEDICINE CLINIC | Age: 58
End: 2018-10-29

## 2018-10-29 VITALS
TEMPERATURE: 96.7 F | DIASTOLIC BLOOD PRESSURE: 91 MMHG | SYSTOLIC BLOOD PRESSURE: 130 MMHG | OXYGEN SATURATION: 97 % | RESPIRATION RATE: 14 BRPM | BODY MASS INDEX: 30.41 KG/M2 | WEIGHT: 182.5 LBS | HEART RATE: 67 BPM | HEIGHT: 65 IN

## 2018-10-29 DIAGNOSIS — E78.00 HYPERCHOLESTEROLEMIA: ICD-10-CM

## 2018-10-29 DIAGNOSIS — Z00.00 PHYSICAL EXAM: Primary | ICD-10-CM

## 2018-10-29 DIAGNOSIS — I10 ESSENTIAL HYPERTENSION: ICD-10-CM

## 2018-10-29 DIAGNOSIS — E55.9 VITAMIN D DEFICIENCY: ICD-10-CM

## 2018-10-29 DIAGNOSIS — Z01.89 LABORATORY EXAMINATION: ICD-10-CM

## 2018-10-29 NOTE — PROGRESS NOTES
Pool Moreno  Identified pt with two pt identifiers(name and ). Chief Complaint Patient presents with  Complete Physical  
 
 
1. Have you been to the ER, urgent care clinic since your last visit? Hospitalized since your last visit? No 
 
2. Have you seen or consulted any other health care providers outside of the [address] [place] [address] since your last visit? Include any pap smears or colon screening. No 
 
In the event something were to happen to you and you were unable to speak on your behalf, do you have an Advance Directive/ Living Will in place stating your wishes? YES If yes, do we have a copy on file NO If no, would you like information:     
 
 
 
Would you like to sign up for [employer] today, if you have not already done so? Yes If not, would you like information on [employer], and how to sign up at a later time? Yes 
 
 
Medication reconciliation up to date and corrected with patient at this time. Today's provider has been notified of reason for visit, vitals and flowsheets obtained on patients. Reviewed record in preparation for visit, huddled with provider and have obtained necessary documentation. Health Maintenance Due Topic  DTaP/Tdap/Td series (1 - Tdap)  Shingrix Vaccine Age 50> (1 of 2)  BREAST CANCER SCRN MAMMOGRAM   
 PAP AKA CERVICAL CYTOLOGY Wt Readings from Last 3 Encounters:  
18 181 lb 14.4 oz (82.5 kg) 17 187 lb 4.8 oz (85 kg) 17 184 lb (83.5 kg) Temp Readings from Last 3 Encounters:  
18 97.8 °F (36.6 °C) (Oral) 17 98 °F (36.7 °C) (Oral) 17 98 °F (36.7 °C) (Oral) BP Readings from Last 3 Encounters:  
18 116/73  
17 128/75  
17 124/84 Pulse Readings from Last 3 Encounters:  
18 100  
17 84  
17 72 Vitals:  
 10/29/18 1425 BP: (!) 130/91 Pulse: 67 Resp: 14 Temp: 96.7 °F (35.9 °C) TempSrc: Oral  
SpO2: 97% Weight: 182 lb 8 oz (82.8 kg) Height: 5' 4.69\" (1.643 m) PainSc:   0 - No pain Learning Assessment: 
:  
 
Learning Assessment 10/29/2014 PRIMARY LEARNER Patient PRIMARY LANGUAGE ENGLISH  
LEARNER PREFERENCE PRIMARY VIDEOS  
ANSWERED BY patient RELATIONSHIP SELF Depression Screening: 
:  
 
PHQ over the last two weeks 10/29/2018 Little interest or pleasure in doing things Not at all Feeling down, depressed, irritable, or hopeless Not at all Total Score PHQ 2 0 Fall Risk Assessment: 
:  
 
Fall Risk Assessment, last 12 mths 10/29/2018 Able to walk? Yes Fall in past 12 months? No  
 
 
Abuse Screening: 
:  
 
Abuse Screening Questionnaire 3/22/2018 Do you ever feel afraid of your partner? Cornelius Ayala Are you in a relationship with someone who physically or mentally threatens you? Cornelius Ayala Is it safe for you to go home? Y  
 
 
ADL Screening: 
:  
 

## 2018-10-29 NOTE — PROGRESS NOTES
HISTORY OF PRESENT ILLNESS Roberto Purdy is a 62 y.o. female. HPI Here for [facility]. Eye doctor past yr. Dentist 2 x annually. Colonoscopy '12.  10 yr repeat. Gyn and mammo annually. DEXA 4 yrs ago. No signif hx past yr. No regular exercise. Some upper back discomfort. Watches diet. Discussed new Shingles vaccine. Patient Active Problem List  
Diagnosis Code  Essential hypertension I10  Vitamin D deficiency E55.9  Hypercholesterolemia E78.00 Current Outpatient Medications Medication Sig Dispense Refill  losartan-hydroCHLOROthiazide (HYZAAR) 50-12.5 mg per tablet TAKE 1 TABLET BY MOUTH EVERY DAY 90 Tab 0  
 OTHER 1 Cap daily. B-Complex #12    
 OTHER Vit D 3 2000 iu daily  atorvastatin (LIPITOR) 20 mg tablet TAKE 1 TABLET BY MOUTH DAILY 90 Tab 3  
 fish oil-omega-3 fatty acids 340-1,000 mg capsule Take 1 Cap by mouth daily. Allergies Allergen Reactions  Enalapril Cough  Procardia [Nifedipine] Other (comments)  
  headache Past Medical History:  
Diagnosis Date  Acid reflux  Advance directive discussed with patient 05/18/2015  Anemia NEC  Back pain 9/15/15 [facility] notes  Cyst   
 History of chicken pox  Hypercholesterolemia  Hypertension   
 no treated at this time  Redundant colon 5/2012  
 on scope sameer frances  Screening for HPV (human papillomavirus) 1/16/15 Dr Betts Roya positive PAP repeat due 1/2016 Past Surgical History:  
Procedure Laterality Date  CT HEART W/O CONT WITH CALCIUM  5/2008  
 zero  HX ADENOIDECTOMY  HX HYSTERECTOMY Hyst for DUB  
 HX TONSILLECTOMY    
 t and a  
 AK COLONOSCOPY FLX DX W/COLLJ SPEC WHEN PFRMD  5/7/2012  
  with barium enema follow up redundant Family History Problem Relation Age of Onset  Cancer Father   
     lung  Hypertension Father  Elevated Lipids Father  Cancer Paternal Uncle   
     colon  Heart Disease Maternal Grandmother  Cancer Paternal Grandfather   
     lung  Cancer Paternal Uncle   
     colon  Heart Disease Mother  Hypertension Mother  Elevated Lipids Mother  Diabetes Sister  Hypertension Sister  Hypertension Brother  Elevated Lipids Brother  Cancer Paternal Aunt   
     stomach  Cancer Paternal Aunt   
     stomach  Lung Disease Paternal Uncle  Lung Disease Paternal Uncle  Other Sister Social History Tobacco Use  Smoking status: Never Smoker  Smokeless tobacco: Never Used Substance Use Topics  Alcohol use: No  
  
Lab Results Component Value Date/Time WBC 6.0 09/20/2017 10:43 AM  
 HGB 12.6 09/20/2017 10:43 AM  
 HCT 37.7 09/20/2017 10:43 AM  
 PLATELET 038 04/88/3328 10:43 AM  
 MCV 82 09/20/2017 10:43 AM  
 
Lab Results Component Value Date/Time Glucose 86 09/20/2017 10:43 AM  
 LDL, calculated 204 (H) 09/20/2017 10:43 AM  
 Creatinine 0.73 09/20/2017 10:43 AM  
  
Lab Results Component Value Date/Time Cholesterol, total 274 (H) 09/20/2017 10:43 AM  
 HDL Cholesterol 48 09/20/2017 10:43 AM  
 LDL, calculated 204 (H) 09/20/2017 10:43 AM  
 Triglyceride 108 09/20/2017 10:43 AM  
 CHOL/HDL Ratio 4.5 07/09/2009 08:24 AM  
 
Lab Results Component Value Date/Time ALT (SGPT) 34 (H) 09/20/2017 10:43 AM  
 AST (SGOT) 30 09/20/2017 10:43 AM  
 Alk. phosphatase 89 09/20/2017 10:43 AM  
 Bilirubin, total 0.4 09/20/2017 10:43 AM  
 Albumin 4.4 09/20/2017 10:43 AM  
 Protein, total 7.7 09/20/2017 10:43 AM  
 PLATELET 919 76/18/7946 10:43 AM  
 
Lab Results Component Value Date/Time  GFR est non-AA 92 09/20/2017 10:43 AM  
 GFR est  09/20/2017 10:43 AM  
 Creatinine 0.73 09/20/2017 10:43 AM  
 BUN 11 09/20/2017 10:43 AM  
 Sodium 141 09/20/2017 10:43 AM  
 Potassium 4.0 09/20/2017 10:43 AM  
 Chloride 99 09/20/2017 10:43 AM  
 CO2 26 09/20/2017 10:43 AM  
 
 Lab Results Component Value Date/Time TSH 1.090 09/20/2017 10:43 AM  
  
Lab Results Component Value Date/Time Glucose 86 09/20/2017 10:43 AM  
   
Fasting Review of Systems Constitutional: Negative. HENT: Negative. Eyes: Negative. Respiratory: Negative. Cardiovascular: Negative. Gastrointestinal: Negative. Genitourinary: Negative. Musculoskeletal: Negative. Skin: Negative. Neurological: Negative. Endo/Heme/Allergies: Negative. Psychiatric/Behavioral: Negative. Physical Exam  
Vitals:  
 10/29/18 1425 BP: (!) 130/91 Pulse: 67 Resp: 14 Temp: 96.7 °F (35.9 °C) TempSrc: Oral  
SpO2: 97% Weight: 182 lb 8 oz (82.8 kg) Height: 5' 4.69\" (1.643 m) General 
  alert, cooperative, no distress, appears stated age Head Normocephalic, without obvious abnormality, atraumatic Eyes 
  conjunctivae/corneas clear. PERRL. Fundi benign Ears Canals and TMs clear Nose Passages patent. Mucosa normal. No drainage or sinus tenderness. Throat Lips, mucosa, and tongue normal. Teeth and gums normal.  Post pharynx neg. Neck supple, nontender, no adenopathy, thyroid: not enlarged, no masses/nodules, no carotid bruits Back 
   symmetric, no curvature. FROM. No CVA tenderness. Tenderness of left ant chest and post upper thoracic muscles. Lungs 
   clear to auscultation bilaterally Breasts Declined Heart Regular rate and rhythm, with no murmur, click, rub or gallop Abdomen   Soft, non-tender. Bowel sounds normal. No masses,  No organomegaly Genitalia and Rectal  Deferred. Per GYN. Extremities 
 extremities normal, atraumatic, no cyanosis or edema Pulses 1-2+ and symmetric except absent p.t.'s  
Skin No rashes or lesions Neurologic Romberg neg, nml heel, toe and Tandem gait. DTRs 2+ symmetric ASSESSMENT and PLAN 
  ICD-10-CM ICD-9-CM 1. Physical exam Z00.00 V70.9 2. Hypercholesterolemia E78.00 272.0 3. Vitamin D deficiency E55.9 268.9 4. Essential hypertension I10 401.9 5. Laboratory examination Z01.89 V72.60 Follow-up Disposition: 
Return in about 1 year (around 10/29/2019) for [facility], labs.

## 2018-10-30 LAB
25(OH)D3+25(OH)D2 SERPL-MCNC: 53.7 NG/ML (ref 30–100)
ALBUMIN SERPL-MCNC: 4.3 G/DL (ref 3.5–5.5)
ALBUMIN/GLOB SERPL: 1.3 {RATIO} (ref 1.2–2.2)
ALP SERPL-CCNC: 91 IU/L (ref 39–117)
ALT SERPL-CCNC: 22 IU/L (ref 0–32)
APPEARANCE UR: CLEAR
AST SERPL-CCNC: 24 IU/L (ref 0–40)
BACTERIA #/AREA URNS HPF: ABNORMAL /[HPF]
BASOPHILS # BLD AUTO: 0 X10E3/UL (ref 0–0.2)
BASOPHILS NFR BLD AUTO: 0 %
BILIRUB SERPL-MCNC: 0.4 MG/DL (ref 0–1.2)
BILIRUB UR QL STRIP: NEGATIVE
BUN SERPL-MCNC: 9 MG/DL (ref 6–24)
BUN/CREAT SERPL: 11 (ref 9–23)
CALCIUM SERPL-MCNC: 9.5 MG/DL (ref 8.7–10.2)
CASTS URNS QL MICRO: ABNORMAL /LPF
CHLORIDE SERPL-SCNC: 103 MMOL/L (ref 96–106)
CHOLEST SERPL-MCNC: 167 MG/DL (ref 100–199)
CO2 SERPL-SCNC: 24 MMOL/L (ref 20–29)
COLOR UR: YELLOW
CREAT SERPL-MCNC: 0.8 MG/DL (ref 0.57–1)
EOSINOPHIL # BLD AUTO: 0.2 X10E3/UL (ref 0–0.4)
EOSINOPHIL NFR BLD AUTO: 2 %
EPI CELLS #/AREA URNS HPF: ABNORMAL /HPF
ERYTHROCYTE [DISTWIDTH] IN BLOOD BY AUTOMATED COUNT: 14 % (ref 12.3–15.4)
GLOBULIN SER CALC-MCNC: 3.2 G/DL (ref 1.5–4.5)
GLUCOSE SERPL-MCNC: 80 MG/DL (ref 65–99)
GLUCOSE UR QL: NEGATIVE
HCT VFR BLD AUTO: 37.1 % (ref 34–46.6)
HDLC SERPL-MCNC: 48 MG/DL
HGB BLD-MCNC: 12 G/DL (ref 11.1–15.9)
HGB UR QL STRIP: ABNORMAL
IMM GRANULOCYTES # BLD: 0 X10E3/UL (ref 0–0.1)
IMM GRANULOCYTES NFR BLD: 0 %
INTERPRETATION, 910389: NORMAL
KETONES UR QL STRIP: ABNORMAL
LDLC SERPL CALC-MCNC: 104 MG/DL (ref 0–99)
LEUKOCYTE ESTERASE UR QL STRIP: ABNORMAL
LYMPHOCYTES # BLD AUTO: 3.2 X10E3/UL (ref 0.7–3.1)
LYMPHOCYTES NFR BLD AUTO: 40 %
MCH RBC QN AUTO: 27.1 PG (ref 26.6–33)
MCHC RBC AUTO-ENTMCNC: 32.3 G/DL (ref 31.5–35.7)
MCV RBC AUTO: 84 FL (ref 79–97)
MICRO URNS: ABNORMAL
MONOCYTES # BLD AUTO: 0.4 X10E3/UL (ref 0.1–0.9)
MONOCYTES NFR BLD AUTO: 6 %
MUCOUS THREADS URNS QL MICRO: PRESENT
NEUTROPHILS # BLD AUTO: 4.1 X10E3/UL (ref 1.4–7)
NEUTROPHILS NFR BLD AUTO: 52 %
NITRITE UR QL STRIP: NEGATIVE
PH UR STRIP: 5 [PH] (ref 5–7.5)
PLATELET # BLD AUTO: 257 X10E3/UL (ref 150–379)
POTASSIUM SERPL-SCNC: 3.7 MMOL/L (ref 3.5–5.2)
PROT SERPL-MCNC: 7.5 G/DL (ref 6–8.5)
PROT UR QL STRIP: NEGATIVE
RBC # BLD AUTO: 4.43 X10E6/UL (ref 3.77–5.28)
RBC #/AREA URNS HPF: ABNORMAL /HPF
SODIUM SERPL-SCNC: 143 MMOL/L (ref 134–144)
SP GR UR: 1.02 (ref 1–1.03)
TRIGL SERPL-MCNC: 76 MG/DL (ref 0–149)
TSH SERPL DL<=0.005 MIU/L-ACNC: 0.97 UIU/ML (ref 0.45–4.5)
UROBILINOGEN UR STRIP-MCNC: 0.2 MG/DL (ref 0.2–1)
VLDLC SERPL CALC-MCNC: 15 MG/DL (ref 5–40)
WBC # BLD AUTO: 7.9 X10E3/UL (ref 3.4–10.8)
WBC #/AREA URNS HPF: ABNORMAL /HPF

## 2018-10-30 NOTE — PROGRESS NOTES
Writer called patient with results and recommendations from Dr. Aiyana Sharma. Patient did not answer. Writer left  requesting phone call back.

## 2018-10-31 ENCOUNTER — TELEPHONE (OUTPATIENT)
Dept: FAMILY MEDICINE CLINIC | Age: 58
End: 2018-10-31

## 2018-10-31 DIAGNOSIS — R82.81 PYURIA: Primary | ICD-10-CM

## 2018-10-31 NOTE — TELEPHONE ENCOUNTER
Writer returned patients call, message was left. Patient was also sent a my chart message regarding labs.   (Labs all ok)

## 2018-10-31 NOTE — TELEPHONE ENCOUNTER
----- Message from Teo Campos sent at 10/31/2018  9:29 AM EDT -----  Regarding: Dr. Zurita End requesting a call back from a missed call. Pt best contact number is [phone].

## 2018-10-31 NOTE — TELEPHONE ENCOUNTER
Writer called patient back regarding lab results and recommendations from Dr. Becky Huang. Patient did not answer. Writer left  requesting phone call back. Verbal Order Read Back Per Destiney Macdonald MD for Urine Culture.  Martinez Salvage verified correct name and  with PCP

## 2018-11-02 LAB — BACTERIA UR CULT: NORMAL

## 2018-11-02 NOTE — PROGRESS NOTES
Spoke with patient after obtaining 2 patient identifiers Patient made aware of lab results. She will make a lab appt only for [facility]. Verbalized understanding.

## 2018-11-09 NOTE — TELEPHONE ENCOUNTER
Writer called patient, left message to call office. Patient does not need to go to labs today, last urine was clear.

## 2019-01-08 ENCOUNTER — TELEPHONE (OUTPATIENT)
Dept: FAMILY MEDICINE CLINIC | Age: 59
End: 2019-01-08

## 2019-01-08 NOTE — TELEPHONE ENCOUNTER
Writer called patient back regarding labs. Writer spoke with patient. Patient verified . Writer asked which labs she was requesting. Stated that the labs we have are from October and November. Patient stated that she needed those lab results, stated that she never heard from our office. Writer verbalized that according to our chart, she spoke with a nurse on 2018 regarding her results and needing to come in for a UC, which she did as well. Patient stated that she does not recall talking to anyone from our office. Writer verbalized understanding. Writer notified patient of negative UC and results from improved cholesterol from Dr. Contreras Avila. Patient verbalized understanding and appreciation.

## 2019-01-17 ENCOUNTER — OFFICE VISIT (OUTPATIENT)
Dept: FAMILY MEDICINE CLINIC | Age: 59
End: 2019-01-17

## 2019-01-17 VITALS
HEART RATE: 85 BPM | SYSTOLIC BLOOD PRESSURE: 121 MMHG | DIASTOLIC BLOOD PRESSURE: 61 MMHG | OXYGEN SATURATION: 94 % | HEIGHT: 65 IN | WEIGHT: 185 LBS | TEMPERATURE: 97 F | RESPIRATION RATE: 20 BRPM | BODY MASS INDEX: 30.82 KG/M2

## 2019-01-17 DIAGNOSIS — B00.9 HSV INFECTION: Primary | ICD-10-CM

## 2019-01-17 PROBLEM — R73.02 IMPAIRED GLUCOSE TOLERANCE: Status: ACTIVE | Noted: 2018-04-05

## 2019-01-17 PROBLEM — Z90.710 HISTORY OF TOTAL HYSTERECTOMY: Status: ACTIVE | Noted: 2018-04-05

## 2019-01-17 RX ORDER — ACYCLOVIR 800 MG/1
800 TABLET ORAL 3 TIMES DAILY
Qty: 6 TAB | Refills: 0 | Status: SHIPPED | OUTPATIENT
Start: 2019-01-17 | End: 2019-01-19

## 2019-01-17 NOTE — PROGRESS NOTES
Sxs began past Sunday. Getting over cold sxs. Face broke out Monday. Had cold sores in past on mouth and nose. Never as bad as present episode. Non productive cough. Visit Vitals /61 (BP 1 Location: Left arm, BP Patient Position: Sitting) Pulse 85 Temp 97 °F (36.1 °C) (Oral) Resp 20 Ht 5' 5.16\" (1.655 m) Wt 185 lb (83.9 kg) SpO2 94% BMI 30.64 kg/m² Patient alert and cooperative. Clustered pustules, erythematous base around the nasal alae. Assessment: 1. 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 Plan: 1. Script for high dose Acyclovir 800 t.i.d. for two days given. 2. Warned of contagiousness. 3. Follow otherwise here prn.

## 2019-01-17 NOTE — PROGRESS NOTES
Soumya Lyle  Identified pt with two pt identifiers(name and ). Chief Complaint Patient presents with  Nasal Congestion  Nasal Pain  
  sores on outer nostrils 1. Have you been to the ER, urgent care clinic since your last visit? Hospitalized since your last visit? No 
 
2. Have you seen or consulted any other health care providers outside of the [address] [place] [address] since your last visit? Include any pap smears or colon screening. Dr. Sweet Laura for pap smear at [facility] In the event something were to happen to you and you were unable to speak on your behalf, do you have an Advance Directive/ Living Will in place stating your wishes? YES If yes, do we have a copy on file NO If no, would you like information:     
 
 
 
Would you like to sign up for MyChart today, if you have not already done so? Patient has a mychart If not, would you like information on MyChart, and how to sign up at a later time? no 
 
 
Medication reconciliation up to date and corrected with patient at this time. Today's provider has been notified of reason for visit, vitals and flowsheets obtained on patients. Reviewed record in preparation for visit, huddled with provider and have obtained necessary documentation. Health Maintenance Due Topic  BREAST CANCER SCRN MAMMOGRAM   
 PAP AKA CERVICAL CYTOLOGY  MEDICARE YEARLY EXAM   
 
 
Wt Readings from Last 3 Encounters:  
19 185 lb (83.9 kg) 10/29/18 182 lb 8 oz (82.8 kg) 18 181 lb 14.4 oz (82.5 kg) Temp Readings from Last 3 Encounters:  
10/29/18 96.7 °F (35.9 °C) (Oral) 18 97.8 °F (36.6 °C) (Oral) 17 98 °F (36.7 °C) (Oral) BP Readings from Last 3 Encounters:  
10/29/18 (!) 130/91  
18 116/73  
17 128/75 Pulse Readings from Last 3 Encounters:  
10/29/18 67  
18 100  
17 84 Vitals:  
 19 1206 BP: 121/61 Pulse: 85 Resp: 20 Temp: 97 °F (36.1 °C) TempSrc: Oral  
 SpO2: 94% Weight: 185 lb (83.9 kg) Height: 5' 5.16\" (1.655 m) PainSc:   0 - No pain Learning Assessment: 
:  
 
Learning Assessment 10/29/2014 PRIMARY LEARNER Patient PRIMARY LANGUAGE ENGLISH  
LEARNER PREFERENCE PRIMARY VIDEOS  
ANSWERED BY patient RELATIONSHIP SELF Depression Screening: 
:  
 
PHQ over the last two weeks 1/17/2019 Little interest or pleasure in doing things Not at all Feeling down, depressed, irritable, or hopeless Not at all Total Score PHQ 2 0 Fall Risk Assessment: 
:  
 
Fall Risk Assessment, last 12 mths 1/17/2019 Able to walk? Yes Fall in past 12 months? No  
 
 
Abuse Screening: 
:  
 
Abuse Screening Questionnaire 1/17/2019 3/22/2018 Do you ever feel afraid of your partner? Lyle Alter Are you in a relationship with someone who physically or mentally threatens you? Llye Alter Is it safe for you to go home? Y Y  
 
 
ADL Screening: 
:  
 

## 2019-03-25 ENCOUNTER — OFFICE VISIT (OUTPATIENT)
Dept: FAMILY MEDICINE CLINIC | Age: 59
End: 2019-03-25

## 2019-03-25 VITALS
OXYGEN SATURATION: 100 % | RESPIRATION RATE: 18 BRPM | HEIGHT: 64 IN | HEART RATE: 87 BPM | WEIGHT: 185.8 LBS | DIASTOLIC BLOOD PRESSURE: 75 MMHG | SYSTOLIC BLOOD PRESSURE: 114 MMHG | BODY MASS INDEX: 31.72 KG/M2 | TEMPERATURE: 97.7 F

## 2019-03-25 DIAGNOSIS — R05.9 COUGH: Primary | ICD-10-CM

## 2019-03-25 RX ORDER — AZITHROMYCIN 250 MG/1
500 TABLET, FILM COATED ORAL DAILY
Qty: 6 TAB | Refills: 0 | Status: SHIPPED | OUTPATIENT
Start: 2019-03-25 | End: 2019-03-28

## 2019-03-25 RX ORDER — PENICILLIN V POTASSIUM 500 MG/1
500 TABLET, FILM COATED ORAL
COMMUNITY
Start: 2019-03-23 | End: 2019-04-02

## 2019-03-25 RX ORDER — BENZONATATE 100 MG/1
100-200 CAPSULE ORAL
Qty: 14 CAP | Refills: 0 | Status: SHIPPED | OUTPATIENT
Start: 2019-03-25 | End: 2019-04-01

## 2019-03-25 RX ORDER — LIDOCAINE HYDROCHLORIDE 20 MG/ML
10-15 SOLUTION OROPHARYNGEAL
COMMUNITY
Start: 2019-03-23 | End: 2019-03-26

## 2019-03-25 NOTE — PROGRESS NOTES
Vilma Solares  Identified pt with two pt identifiers(name and ). Chief Complaint   Patient presents with    Cough       1. Have you been to the ER, urgent care clinic since your last visit? Hospitalized since your last visit? [facility]    2. Have you seen or consulted any other health care providers outside of the [address] [place] [address] since your last visit? Include any pap smears or colon screening. No      Would you like to sign up for MyChart today, if you have not already done so? Patient has a mychart  If not, would you like information on MyChart, and how to sign up at a later time? No      Medication reconciliation up to date and corrected with patient at this time. Today's provider has been notified of reason for visit, vitals and flowsheets obtained on patients. Reviewed record in preparation for visit, huddled with provider and have obtained necessary documentation.       Health Maintenance Due   Topic    PAP AKA CERVICAL CYTOLOGY        Wt Readings from Last 3 Encounters:   19 185 lb 12.8 oz (84.3 kg)   19 185 lb (83.9 kg)   10/29/18 182 lb 8 oz (82.8 kg)     Temp Readings from Last 3 Encounters:   19 97.7 °F (36.5 °C) (Oral)   19 97 °F (36.1 °C) (Oral)   10/29/18 96.7 °F (35.9 °C) (Oral)     BP Readings from Last 3 Encounters:   19 114/75   19 121/61   10/29/18 (!) 130/91     Pulse Readings from Last 3 Encounters:   19 87   19 85   10/29/18 67     Vitals:    19 1620   BP: 114/75   Pulse: 87   Resp: 18   Temp: 97.7 °F (36.5 °C)   TempSrc: Oral   SpO2: 100%   Weight: 185 lb 12.8 oz (84.3 kg)   Height: 5' 4.37\" (1.635 m)   PainSc:   0 - No pain         Learning Assessment:  :     Learning Assessment 10/29/2014   PRIMARY LEARNER Patient   PRIMARY LANGUAGE ENGLISH   LEARNER PREFERENCE PRIMARY VIDEOS   ANSWERED BY patient   RELATIONSHIP SELF       Depression Screening:  :     3 most recent PHQ Screens 2019   Little interest or pleasure in doing things Not at all   Feeling down, depressed, irritable, or hopeless Not at all   Total Score PHQ 2 0       Fall Risk Assessment:  :     Fall Risk Assessment, last 12 mths 1/17/2019   Able to walk? Yes   Fall in past 12 months? No       Abuse Screening:  :     Abuse Screening Questionnaire 1/17/2019 3/22/2018   Do you ever feel afraid of your partner? N N   Are you in a relationship with someone who physically or mentally threatens you? N N   Is it safe for you to go home?  Y Y       ADL Screening:  :     ADL Assessment 1/17/2019   Feeding yourself No Help Needed   Getting from bed to chair No Help Needed   Getting dressed No Help Needed   Bathing or showering No Help Needed   Walk across the room (includes cane/walker) No Help Needed   Using the telphone No Help Needed   Taking your medications No Help Needed   Preparing meals No Help Needed   Managing money (expenses/bills) No Help Needed   Moderately strenuous housework (laundry) No Help Needed   Shopping for personal items (toiletries/medicines) No Help Needed   Shopping for groceries No Help Needed   Driving No Help Needed   Climbing a flight of stairs No Help Needed   Getting to places beyond walking distances No Help Needed

## 2019-03-25 NOTE — PROGRESS NOTES
Went to  in MD.  Tested pos for Strep. Put on Pcn. Bad cough. Hoarse. Visit Vitals  /75 (BP 1 Location: Left arm, BP Patient Position: Sitting)   Pulse 87   Temp 97.7 °F (36.5 °C) (Oral)   Resp 18   Ht 5' 4.37\" (1.635 m)   Wt 185 lb 12.8 oz (84.3 kg)   LMP  (LMP Unknown)   SpO2 100%   BMI 31.53 kg/m²       Patient alert and cooperative. Lungs clear. Assessment:  1. Strep with cough. Plan:  1. Tessalon Perles 100 mg, one to two at bedtime. 2. Z-Kevin sent to pharmacy if cough and hoarseness not improved in the next four to five days after seven day course of penicillin. 3. Recheck here otherwise prn.

## 2019-04-24 RX ORDER — LOSARTAN POTASSIUM 50 MG/1
50 TABLET ORAL DAILY
Qty: 90 TAB | Refills: 1 | Status: SHIPPED | OUTPATIENT
Start: 2019-04-24 | End: 2019-10-03 | Stop reason: SDUPTHER

## 2019-04-24 RX ORDER — HYDROCHLOROTHIAZIDE 12.5 MG/1
12.5 TABLET ORAL DAILY
Qty: 90 TAB | Refills: 1 | Status: SHIPPED | OUTPATIENT
Start: 2019-04-24 | End: 2019-07-03 | Stop reason: SDUPTHER

## 2019-04-24 NOTE — TELEPHONE ENCOUNTER
PCP: Bia Rob MD    Last appt: 3/25/2019  No future appointments. Requested Prescriptions     Pending Prescriptions Disp Refills    losartan (COZAAR) 50 mg tablet 90 Tab 0     Sig: Take 1 Tab by mouth daily.  hydroCHLOROthiazide (HYDRODIURIL) 12.5 mg tablet 90 Tab 0     Sig: Take 1 Tab by mouth daily.        Prior labs and Blood pressures:  BP Readings from Last 3 Encounters:   03/25/19 114/75   01/17/19 121/61   10/29/18 (!) 130/91     Lab Results   Component Value Date/Time    Sodium 143 10/29/2018 03:13 PM    Potassium 3.7 10/29/2018 03:13 PM    Chloride 103 10/29/2018 03:13 PM    CO2 24 10/29/2018 03:13 PM    Anion gap 9 01/13/2016 06:04 PM    Glucose 80 10/29/2018 03:13 PM    BUN 9 10/29/2018 03:13 PM    Creatinine 0.80 10/29/2018 03:13 PM    BUN/Creatinine ratio 11 10/29/2018 03:13 PM    GFR est AA 94 10/29/2018 03:13 PM    GFR est non-AA 82 10/29/2018 03:13 PM    Calcium 9.5 10/29/2018 03:13 PM     No results found for: HBA1C, HGBE8, NWU5RQNG, NKG4QZRK  Lab Results   Component Value Date/Time    Cholesterol, total 167 10/29/2018 03:13 PM    HDL Cholesterol 48 10/29/2018 03:13 PM    LDL, calculated 104 (H) 10/29/2018 03:13 PM    VLDL, calculated 15 10/29/2018 03:13 PM    Triglyceride 76 10/29/2018 03:13 PM    CHOL/HDL Ratio 4.5 07/09/2009 08:24 AM     Lab Results   Component Value Date/Time    VITAMIN D, 25-HYDROXY 53.7 10/29/2018 03:13 PM       Lab Results   Component Value Date/Time    TSH 0.974 10/29/2018 03:13 PM

## 2019-04-24 NOTE — TELEPHONE ENCOUNTER
[facility] sent an alternative request for Losartan-HCTZ. Medication is on back order. Medication needs to be separate or alternative Rx sent.

## 2019-07-30 RX ORDER — HYDROCHLOROTHIAZIDE 12.5 MG/1
TABLET ORAL
Qty: 90 TAB | Refills: 0 | Status: SHIPPED | OUTPATIENT
Start: 2019-07-30 | End: 2019-10-20 | Stop reason: SDUPTHER

## 2019-07-30 NOTE — TELEPHONE ENCOUNTER
Patient called needing refill on her hydroCHLOROthiazide (HYDRODIURIL) 12.5 mg tablet      Send to [facility]    Call back once sent over to [facility] at [phone]

## 2019-07-30 NOTE — TELEPHONE ENCOUNTER
PCP: Antoine Tanner MD    Last appt: 3/25/2019  No future appointments.     Requested Prescriptions     Pending Prescriptions Disp Refills    hydroCHLOROthiazide (HYDRODIURIL) 12.5 mg tablet 90 Tab 0       Prior labs and Blood pressures:  BP Readings from Last 3 Encounters:   03/25/19 114/75   01/17/19 121/61   10/29/18 (!) 130/91     Lab Results   Component Value Date/Time    Sodium 143 10/29/2018 03:13 PM    Potassium 3.7 10/29/2018 03:13 PM    Chloride 103 10/29/2018 03:13 PM    CO2 24 10/29/2018 03:13 PM    Anion gap 9 01/13/2016 06:04 PM    Glucose 80 10/29/2018 03:13 PM    BUN 9 10/29/2018 03:13 PM    Creatinine 0.80 10/29/2018 03:13 PM    BUN/Creatinine ratio 11 10/29/2018 03:13 PM    GFR est AA 94 10/29/2018 03:13 PM    GFR est non-AA 82 10/29/2018 03:13 PM    Calcium 9.5 10/29/2018 03:13 PM     No results found for: HBA1C, HGBE8, SIS1EZGL, LGB2GKCD, NDR3RNKM  Lab Results   Component Value Date/Time    Cholesterol, total 167 10/29/2018 03:13 PM    HDL Cholesterol 48 10/29/2018 03:13 PM    LDL, calculated 104 (H) 10/29/2018 03:13 PM    VLDL, calculated 15 10/29/2018 03:13 PM    Triglyceride 76 10/29/2018 03:13 PM    CHOL/HDL Ratio 4.5 07/09/2009 08:24 AM     Lab Results   Component Value Date/Time    VITAMIN D, 25-HYDROXY 53.7 10/29/2018 03:13 PM       Lab Results   Component Value Date/Time    TSH 0.974 10/29/2018 03:13 PM

## 2019-10-04 NOTE — TELEPHONE ENCOUNTER
PCP: Sky Villeda MD    Last appt: 3/25/2019  No future appointments.     Requested Prescriptions     Pending Prescriptions Disp Refills    losartan (COZAAR) 50 mg tablet [Pharmacy Med Name: LOSARTAN POTASSIUM 50 MG TAB] 90 Tab 1     Sig: TAKE 1 TABLET BY MOUTH EVERY DAY       Prior labs and Blood pressures:  BP Readings from Last 3 Encounters:   03/25/19 114/75   01/17/19 121/61   10/29/18 (!) 130/91     Lab Results   Component Value Date/Time    Sodium 143 10/29/2018 03:13 PM    Potassium 3.7 10/29/2018 03:13 PM    Chloride 103 10/29/2018 03:13 PM    CO2 24 10/29/2018 03:13 PM    Anion gap 9 01/13/2016 06:04 PM    Glucose 80 10/29/2018 03:13 PM    BUN 9 10/29/2018 03:13 PM    Creatinine 0.80 10/29/2018 03:13 PM    BUN/Creatinine ratio 11 10/29/2018 03:13 PM    GFR est AA 94 10/29/2018 03:13 PM    GFR est non-AA 82 10/29/2018 03:13 PM    Calcium 9.5 10/29/2018 03:13 PM     No results found for: HBA1C, HGBE8, HZP5GFNU, JIB5CGHN  Lab Results   Component Value Date/Time    Cholesterol, total 167 10/29/2018 03:13 PM    HDL Cholesterol 48 10/29/2018 03:13 PM    LDL, calculated 104 (H) 10/29/2018 03:13 PM    VLDL, calculated 15 10/29/2018 03:13 PM    Triglyceride 76 10/29/2018 03:13 PM    CHOL/HDL Ratio 4.5 07/09/2009 08:24 AM     Lab Results   Component Value Date/Time    VITAMIN D, 25-HYDROXY 53.7 10/29/2018 03:13 PM       Lab Results   Component Value Date/Time    TSH 0.974 10/29/2018 03:13 PM

## 2019-10-07 RX ORDER — LOSARTAN POTASSIUM 50 MG/1
TABLET ORAL
Qty: 30 TAB | Refills: 0 | Status: SHIPPED | OUTPATIENT
Start: 2019-10-07 | End: 2019-11-11 | Stop reason: SDUPTHER

## 2019-10-21 ENCOUNTER — TELEPHONE (OUTPATIENT)
Dept: FAMILY MEDICINE CLINIC | Age: 59
End: 2019-10-21

## 2019-10-21 RX ORDER — ATORVASTATIN CALCIUM 20 MG/1
TABLET, FILM COATED ORAL
Qty: 90 TAB | Refills: 0 | Status: SHIPPED | OUTPATIENT
Start: 2019-10-21 | End: 2020-01-13

## 2019-10-21 NOTE — TELEPHONE ENCOUNTER
Requested Prescriptions     Pending Prescriptions Disp Refills    atorvastatin (LIPITOR) 20 mg tablet 90 Tab 0

## 2019-10-21 NOTE — TELEPHONE ENCOUNTER
PCP: Itzel Riggins MD    Last appt: 3/25/2019  No future appointments.     Requested Prescriptions     Pending Prescriptions Disp Refills    atorvastatin (LIPITOR) 20 mg tablet 90 Tab 0       Prior labs and Blood pressures:  BP Readings from Last 3 Encounters:   03/25/19 114/75   01/17/19 121/61   10/29/18 (!) 130/91     Lab Results   Component Value Date/Time    Sodium 143 10/29/2018 03:13 PM    Potassium 3.7 10/29/2018 03:13 PM    Chloride 103 10/29/2018 03:13 PM    CO2 24 10/29/2018 03:13 PM    Anion gap 9 01/13/2016 06:04 PM    Glucose 80 10/29/2018 03:13 PM    BUN 9 10/29/2018 03:13 PM    Creatinine 0.80 10/29/2018 03:13 PM    BUN/Creatinine ratio 11 10/29/2018 03:13 PM    GFR est AA 94 10/29/2018 03:13 PM    GFR est non-AA 82 10/29/2018 03:13 PM    Calcium 9.5 10/29/2018 03:13 PM     No results found for: HBA1C, HGBE8, HSM9YPPJ, WUB7NZOB  Lab Results   Component Value Date/Time    Cholesterol, total 167 10/29/2018 03:13 PM    HDL Cholesterol 48 10/29/2018 03:13 PM    LDL, calculated 104 (H) 10/29/2018 03:13 PM    VLDL, calculated 15 10/29/2018 03:13 PM    Triglyceride 76 10/29/2018 03:13 PM    CHOL/HDL Ratio 4.5 07/09/2009 08:24 AM     Lab Results   Component Value Date/Time    VITAMIN D, 25-HYDROXY 53.7 10/29/2018 03:13 PM       Lab Results   Component Value Date/Time    TSH 0.974 10/29/2018 03:13 PM

## 2019-10-22 ENCOUNTER — TELEPHONE (OUTPATIENT)
Dept: FAMILY MEDICINE CLINIC | Age: 59
End: 2019-10-22

## 2019-10-22 RX ORDER — HYDROCHLOROTHIAZIDE 12.5 MG/1
TABLET ORAL
Qty: 90 TAB | Refills: 0 | Status: SHIPPED | OUTPATIENT
Start: 2019-10-22 | End: 2020-01-20

## 2019-10-22 NOTE — TELEPHONE ENCOUNTER
PCP: Sky Villeda MD    Last appt: 3/25/2019  No future appointments.     Requested Prescriptions     Pending Prescriptions Disp Refills    hydroCHLOROthiazide (HYDRODIURIL) 12.5 mg tablet [Pharmacy Med Name: HYDROCHLOROTHIAZIDE 12.5 MG TB] 90 Tab 0     Sig: TAKE 1 TABLET BY MOUTH EVERY DAY       Prior labs and Blood pressures:  BP Readings from Last 3 Encounters:   03/25/19 114/75   01/17/19 121/61   10/29/18 (!) 130/91     Lab Results   Component Value Date/Time    Sodium 143 10/29/2018 03:13 PM    Potassium 3.7 10/29/2018 03:13 PM    Chloride 103 10/29/2018 03:13 PM    CO2 24 10/29/2018 03:13 PM    Anion gap 9 01/13/2016 06:04 PM    Glucose 80 10/29/2018 03:13 PM    BUN 9 10/29/2018 03:13 PM    Creatinine 0.80 10/29/2018 03:13 PM    BUN/Creatinine ratio 11 10/29/2018 03:13 PM    GFR est AA 94 10/29/2018 03:13 PM    GFR est non-AA 82 10/29/2018 03:13 PM    Calcium 9.5 10/29/2018 03:13 PM     No results found for: HBA1C, HGBE8, IMP0QTYU, SON5FQBY  Lab Results   Component Value Date/Time    Cholesterol, total 167 10/29/2018 03:13 PM    HDL Cholesterol 48 10/29/2018 03:13 PM    LDL, calculated 104 (H) 10/29/2018 03:13 PM    VLDL, calculated 15 10/29/2018 03:13 PM    Triglyceride 76 10/29/2018 03:13 PM    CHOL/HDL Ratio 4.5 07/09/2009 08:24 AM     Lab Results   Component Value Date/Time    VITAMIN D, 25-HYDROXY 53.7 10/29/2018 03:13 PM       Lab Results   Component Value Date/Time    TSH 0.974 10/29/2018 03:13 PM

## 2019-11-11 RX ORDER — LOSARTAN POTASSIUM 50 MG/1
TABLET ORAL
Qty: 30 TAB | Refills: 0 | Status: SHIPPED | OUTPATIENT
Start: 2019-11-11 | End: 2019-11-25

## 2019-11-11 NOTE — TELEPHONE ENCOUNTER
PCP: Olga Smith MD    Last appt: 3/25/2019  No future appointments.     Requested Prescriptions     Pending Prescriptions Disp Refills    losartan (COZAAR) 50 mg tablet [Pharmacy Med Name: LOSARTAN POTASSIUM 50 MG TAB] 30 Tab 0     Sig: TAKE 1 TABLET BY MOUTH EVERY DAY       Prior labs and Blood pressures:  BP Readings from Last 3 Encounters:   03/25/19 114/75   01/17/19 121/61   10/29/18 (!) 130/91     Lab Results   Component Value Date/Time    Sodium 143 10/29/2018 03:13 PM    Potassium 3.7 10/29/2018 03:13 PM    Chloride 103 10/29/2018 03:13 PM    CO2 24 10/29/2018 03:13 PM    Anion gap 9 01/13/2016 06:04 PM    Glucose 80 10/29/2018 03:13 PM    BUN 9 10/29/2018 03:13 PM    Creatinine 0.80 10/29/2018 03:13 PM    BUN/Creatinine ratio 11 10/29/2018 03:13 PM    GFR est AA 94 10/29/2018 03:13 PM    GFR est non-AA 82 10/29/2018 03:13 PM    Calcium 9.5 10/29/2018 03:13 PM     No results found for: HBA1C, HGBE8, ETR7MOUN, LIL9TWHQ  Lab Results   Component Value Date/Time    Cholesterol, total 167 10/29/2018 03:13 PM    HDL Cholesterol 48 10/29/2018 03:13 PM    LDL, calculated 104 (H) 10/29/2018 03:13 PM    VLDL, calculated 15 10/29/2018 03:13 PM    Triglyceride 76 10/29/2018 03:13 PM    CHOL/HDL Ratio 4.5 07/09/2009 08:24 AM     Lab Results   Component Value Date/Time    VITAMIN D, 25-HYDROXY 53.7 10/29/2018 03:13 PM       Lab Results   Component Value Date/Time    TSH 0.974 10/29/2018 03:13 PM

## 2019-11-21 NOTE — TELEPHONE ENCOUNTER
Patient overdue for an appointment. [employer] message sent to patient. Patient must make appointment and attend for 90 day refill.

## 2019-11-25 NOTE — TELEPHONE ENCOUNTER
PCP: Domenica Fried MD    Last appt: 3/25/2019  No future appointments. Requested Prescriptions     Pending Prescriptions Disp Refills    losartan (COZAAR) 50 mg tablet 90 Tab 0     Sig: Take 1 Tab by mouth daily.        Prior labs and Blood pressures:  BP Readings from Last 3 Encounters:   03/25/19 114/75   01/17/19 121/61   10/29/18 (!) 130/91     Lab Results   Component Value Date/Time    Sodium 143 10/29/2018 03:13 PM    Potassium 3.7 10/29/2018 03:13 PM    Chloride 103 10/29/2018 03:13 PM    CO2 24 10/29/2018 03:13 PM    Anion gap 9 01/13/2016 06:04 PM    Glucose 80 10/29/2018 03:13 PM    BUN 9 10/29/2018 03:13 PM    Creatinine 0.80 10/29/2018 03:13 PM    BUN/Creatinine ratio 11 10/29/2018 03:13 PM    GFR est AA 94 10/29/2018 03:13 PM    GFR est non-AA 82 10/29/2018 03:13 PM    Calcium 9.5 10/29/2018 03:13 PM     No results found for: HBA1C, HGBE8, FRS5OHNN, GIJ8TCKK  Lab Results   Component Value Date/Time    Cholesterol, total 167 10/29/2018 03:13 PM    HDL Cholesterol 48 10/29/2018 03:13 PM    LDL, calculated 104 (H) 10/29/2018 03:13 PM    VLDL, calculated 15 10/29/2018 03:13 PM    Triglyceride 76 10/29/2018 03:13 PM    CHOL/HDL Ratio 4.5 07/09/2009 08:24 AM     Lab Results   Component Value Date/Time    VITAMIN D, 25-HYDROXY 53.7 10/29/2018 03:13 PM       Lab Results   Component Value Date/Time    TSH 0.974 10/29/2018 03:13 PM

## 2019-11-26 RX ORDER — LOSARTAN POTASSIUM 50 MG/1
50 TABLET ORAL DAILY
Qty: 30 TAB | Refills: 0 | Status: SHIPPED | OUTPATIENT
Start: 2019-11-26 | End: 2019-12-19

## 2019-12-19 RX ORDER — LOSARTAN POTASSIUM 50 MG/1
TABLET ORAL
Qty: 30 TAB | Refills: 0 | Status: SHIPPED | OUTPATIENT
Start: 2019-12-19 | End: 2020-01-20 | Stop reason: DRUGHIGH

## 2019-12-26 ENCOUNTER — DOCUMENTATION ONLY (OUTPATIENT)
Dept: FAMILY MEDICINE CLINIC | Age: 59
End: 2019-12-26

## 2019-12-26 RX ORDER — LOSARTAN POTASSIUM 50 MG/1
TABLET ORAL
Qty: 30 TAB | Refills: 0 | Status: CANCELLED | OUTPATIENT
Start: 2019-12-26

## 2019-12-26 NOTE — TELEPHONE ENCOUNTER
PCP: Sakina Manuel MD    Last appt: 3/25/2019  No future appointments.     Requested Prescriptions     Pending Prescriptions Disp Refills    losartan (COZAAR) 50 mg tablet 30 Tab 0     Sig: TAKE 1 TABLET BY MOUTH EVERY DAY

## 2019-12-26 NOTE — PROGRESS NOTES
Jigar Barrow is a 61 y.o. female  HIPAA verified by two patient identifiers. Patient called and stated that pharmacy does not have her script ready. Phoned pharmacy and 50 mg Losartan is on back order.  Verbal order given for 25 mg Losartan to [facility] pharmacy,

## 2019-12-26 NOTE — TELEPHONE ENCOUNTER
Requested Prescriptions     Pending Prescriptions Disp Refills    losartan (COZAAR) 50 mg tablet 30 Tab 0

## 2019-12-26 NOTE — TELEPHONE ENCOUNTER
The patient was requesting 90 day refills of the:   losartan (COZAAR) 50 mg tablet  0 ordered  EditCancel Reorder      Summary: TAKE 1 TABLET BY MOUTH EVERY DAY, Normal, Disp-30 Tab, R-0   Start: 12/19/2019  Ord/Sold: 12/19/2019 (O)  Report  Adh:   Taking:   Long-term:   Pharmacy: [facility]/pharmacy #[id number] [place] [address]  Med Dose History       Patient Sig: TAKE 1 TABLET BY MOUTH EVERY DAY       Ordered on: 12/19/2019       Authorized by: Cory Coates       Dispense: 27 Tab       Admin Instructions:

## 2020-01-13 ENCOUNTER — DOCUMENTATION ONLY (OUTPATIENT)
Dept: FAMILY MEDICINE CLINIC | Age: 60
End: 2020-01-13

## 2020-01-13 RX ORDER — ATORVASTATIN CALCIUM 20 MG/1
TABLET, FILM COATED ORAL
Qty: 30 TAB | Refills: 0 | Status: SHIPPED | OUTPATIENT
Start: 2020-01-13 | End: 2020-01-29 | Stop reason: SDUPTHER

## 2020-01-17 NOTE — TELEPHONE ENCOUNTER
PCP: Gasper Dickson MD    Last appt: 3/25/2019  No future appointments.     Requested Prescriptions     Pending Prescriptions Disp Refills    losartan (COZAAR) 25 mg tablet [Pharmacy Med Name: LOSARTAN POTASSIUM 25 MG TAB] 60 Tab 0     Sig: TAKE 2 TABLETS BY MOUTH DAILY

## 2020-01-20 RX ORDER — LOSARTAN POTASSIUM 25 MG/1
TABLET ORAL
Qty: 60 TAB | Refills: 0 | Status: SHIPPED | OUTPATIENT
Start: 2020-01-20 | End: 2020-01-29 | Stop reason: SDUPTHER

## 2020-01-29 ENCOUNTER — OFFICE VISIT (OUTPATIENT)
Dept: FAMILY MEDICINE CLINIC | Age: 60
End: 2020-01-29

## 2020-01-29 VITALS
WEIGHT: 178 LBS | DIASTOLIC BLOOD PRESSURE: 67 MMHG | HEIGHT: 64 IN | TEMPERATURE: 98.4 F | SYSTOLIC BLOOD PRESSURE: 102 MMHG | HEART RATE: 84 BPM | OXYGEN SATURATION: 98 % | BODY MASS INDEX: 30.39 KG/M2 | RESPIRATION RATE: 16 BRPM

## 2020-01-29 DIAGNOSIS — J06.9 UPPER RESPIRATORY TRACT INFECTION, UNSPECIFIED TYPE: Primary | ICD-10-CM

## 2020-01-29 DIAGNOSIS — I10 ESSENTIAL HYPERTENSION: ICD-10-CM

## 2020-01-29 DIAGNOSIS — E55.9 VITAMIN D DEFICIENCY: ICD-10-CM

## 2020-01-29 DIAGNOSIS — E78.00 HYPERCHOLESTEROLEMIA: ICD-10-CM

## 2020-01-29 DIAGNOSIS — R73.02 IMPAIRED GLUCOSE TOLERANCE: ICD-10-CM

## 2020-01-29 RX ORDER — HYDROCHLOROTHIAZIDE 12.5 MG/1
TABLET ORAL
Qty: 90 TAB | Refills: 0 | Status: SHIPPED | OUTPATIENT
Start: 2020-01-29 | End: 2020-02-13

## 2020-01-29 RX ORDER — LOSARTAN POTASSIUM 25 MG/1
TABLET ORAL
Qty: 180 TAB | Refills: 0 | Status: SHIPPED | OUTPATIENT
Start: 2020-01-29 | End: 2020-02-13

## 2020-01-29 RX ORDER — ATORVASTATIN CALCIUM 20 MG/1
TABLET, FILM COATED ORAL
Qty: 90 TAB | Refills: 0 | Status: SHIPPED | OUTPATIENT
Start: 2020-01-29 | End: 2020-02-07 | Stop reason: SDUPTHER

## 2020-01-29 NOTE — PROGRESS NOTES
Identified pt with two pt identifiers(name and ). Reviewed record in preparation for visit and have obtained necessary documentation. Chief Complaint   Patient presents with    Cold Symptoms     dry cough, headache, \"back of neck keeps getting a little tired\"; sx appeared last 20; pt denies fever, chills since onset        Health Maintenance Due   Topic    DTaP/Tdap/Td series (1 - Tdap)    Shingrix Vaccine Age 50> (1 of 2)    PAP AKA CERVICAL CYTOLOGY        Coordination of Care Questionnaire:  :   1) Have you been to an emergency room, urgent care, or hospitalized since your last visit? If yes, where when, and reason for visit? no       2. Have seen or consulted any other health care provider since your last visit? If yes, where when, and reason for visit? NO    Patient is accompanied by self I have received verbal consent from Wai Childers to discuss any/all medical information while they are present in the room.

## 2020-01-29 NOTE — PROGRESS NOTES
Sxs began > 10 days ago. Occas cough. No longer in chest.  Chills 4-5 days ago. Nonproductive. Patient denies chest pain, dyspnea, unexpected weight change, unexpected pain, mood or memory changes. Visit Vitals  /67   Pulse 84   Temp 98.4 °F (36.9 °C) (Oral)   Resp 16   Ht 5' 4.37\" (1.635 m)   Wt 178 lb (80.7 kg)   LMP  (LMP Unknown)   SpO2 98%   BMI 30.20 kg/m²     Patient alert and cooperative. Lungs clear. Assessment:  1. Recent URI type illness, possible mild flu. Plan:  1. Follow up if secondary infection. 2. Continue cough med as needed. 3. Follow otherwise here prn.  4. Return for overdue fasting annual blood work.

## 2020-02-07 DIAGNOSIS — E78.00 HYPERCHOLESTEROLEMIA: ICD-10-CM

## 2020-02-07 RX ORDER — ATORVASTATIN CALCIUM 20 MG/1
TABLET, FILM COATED ORAL
Qty: 30 TAB | Refills: 0 | Status: SHIPPED | OUTPATIENT
Start: 2020-02-07 | End: 2020-03-02 | Stop reason: SDUPTHER

## 2020-03-01 DIAGNOSIS — E78.00 HYPERCHOLESTEROLEMIA: ICD-10-CM

## 2020-03-02 RX ORDER — ATORVASTATIN CALCIUM 20 MG/1
TABLET, FILM COATED ORAL
Qty: 30 TAB | Refills: 0 | Status: SHIPPED | OUTPATIENT
Start: 2020-03-02 | End: 2020-03-31

## 2020-03-11 DIAGNOSIS — I10 ESSENTIAL HYPERTENSION: ICD-10-CM

## 2020-03-11 RX ORDER — LOSARTAN POTASSIUM 25 MG/1
TABLET ORAL
Qty: 60 TAB | Refills: 0 | Status: SHIPPED | OUTPATIENT
Start: 2020-03-11 | End: 2020-04-11

## 2020-03-11 RX ORDER — HYDROCHLOROTHIAZIDE 12.5 MG/1
TABLET ORAL
Qty: 30 TAB | Refills: 0 | Status: SHIPPED | OUTPATIENT
Start: 2020-03-11 | End: 2020-04-11

## 2020-05-07 DIAGNOSIS — R82.81 PYURIA: Primary | ICD-10-CM

## 2020-05-08 ENCOUNTER — TELEPHONE (OUTPATIENT)
Dept: FAMILY MEDICINE CLINIC | Age: 60
End: 2020-05-08

## 2020-05-08 LAB
25(OH)D3+25(OH)D2 SERPL-MCNC: 45.9 NG/ML (ref 30–100)
ALBUMIN SERPL-MCNC: 4.4 G/DL (ref 3.8–4.9)
ALBUMIN/GLOB SERPL: 1.5 {RATIO} (ref 1.2–2.2)
ALP SERPL-CCNC: 73 IU/L (ref 39–117)
ALT SERPL-CCNC: 19 IU/L (ref 0–32)
APPEARANCE UR: CLEAR
AST SERPL-CCNC: 19 IU/L (ref 0–40)
BACTERIA #/AREA URNS HPF: ABNORMAL /[HPF]
BASOPHILS # BLD AUTO: 0.1 X10E3/UL (ref 0–0.2)
BASOPHILS NFR BLD AUTO: 1 %
BILIRUB SERPL-MCNC: 0.3 MG/DL (ref 0–1.2)
BILIRUB UR QL STRIP: NEGATIVE
BUN SERPL-MCNC: 13 MG/DL (ref 6–24)
BUN/CREAT SERPL: 15 (ref 9–23)
CALCIUM SERPL-MCNC: 9.5 MG/DL (ref 8.7–10.2)
CASTS URNS QL MICRO: ABNORMAL /LPF
CHLORIDE SERPL-SCNC: 102 MMOL/L (ref 96–106)
CHOLEST SERPL-MCNC: 174 MG/DL (ref 100–199)
CO2 SERPL-SCNC: 25 MMOL/L (ref 20–29)
COLOR UR: YELLOW
CREAT SERPL-MCNC: 0.85 MG/DL (ref 0.57–1)
EOSINOPHIL # BLD AUTO: 0.2 X10E3/UL (ref 0–0.4)
EOSINOPHIL NFR BLD AUTO: 3 %
EPI CELLS #/AREA URNS HPF: ABNORMAL /HPF (ref 0–10)
ERYTHROCYTE [DISTWIDTH] IN BLOOD BY AUTOMATED COUNT: 13 % (ref 11.7–15.4)
EST. AVERAGE GLUCOSE BLD GHB EST-MCNC: 123 MG/DL
GLOBULIN SER CALC-MCNC: 3 G/DL (ref 1.5–4.5)
GLUCOSE SERPL-MCNC: 94 MG/DL (ref 65–99)
GLUCOSE UR QL: NEGATIVE
HBA1C MFR BLD: 5.9 % (ref 4.8–5.6)
HCT VFR BLD AUTO: 38 % (ref 34–46.6)
HDLC SERPL-MCNC: 49 MG/DL
HGB BLD-MCNC: 12.1 G/DL (ref 11.1–15.9)
HGB UR QL STRIP: NEGATIVE
IMM GRANULOCYTES # BLD AUTO: 0 X10E3/UL (ref 0–0.1)
IMM GRANULOCYTES NFR BLD AUTO: 0 %
INTERPRETATION, 910389: NORMAL
KETONES UR QL STRIP: NEGATIVE
LDLC SERPL CALC-MCNC: 110 MG/DL (ref 0–99)
LEUKOCYTE ESTERASE UR QL STRIP: ABNORMAL
LYMPHOCYTES # BLD AUTO: 2.7 X10E3/UL (ref 0.7–3.1)
LYMPHOCYTES NFR BLD AUTO: 40 %
MCH RBC QN AUTO: 27 PG (ref 26.6–33)
MCHC RBC AUTO-ENTMCNC: 31.8 G/DL (ref 31.5–35.7)
MCV RBC AUTO: 85 FL (ref 79–97)
MICRO URNS: ABNORMAL
MONOCYTES # BLD AUTO: 0.6 X10E3/UL (ref 0.1–0.9)
MONOCYTES NFR BLD AUTO: 9 %
MUCOUS THREADS URNS QL MICRO: PRESENT
NEUTROPHILS # BLD AUTO: 3.1 X10E3/UL (ref 1.4–7)
NEUTROPHILS NFR BLD AUTO: 47 %
NITRITE UR QL STRIP: NEGATIVE
PH UR STRIP: 8 [PH] (ref 5–7.5)
PLATELET # BLD AUTO: 247 X10E3/UL (ref 150–450)
POTASSIUM SERPL-SCNC: 4 MMOL/L (ref 3.5–5.2)
PROT SERPL-MCNC: 7.4 G/DL (ref 6–8.5)
PROT UR QL STRIP: NEGATIVE
RBC # BLD AUTO: 4.48 X10E6/UL (ref 3.77–5.28)
RBC #/AREA URNS HPF: ABNORMAL /HPF (ref 0–2)
SODIUM SERPL-SCNC: 141 MMOL/L (ref 134–144)
SP GR UR: 1.01 (ref 1–1.03)
TRIGL SERPL-MCNC: 74 MG/DL (ref 0–149)
TSH SERPL DL<=0.005 MIU/L-ACNC: 1.68 UIU/ML (ref 0.45–4.5)
UROBILINOGEN UR STRIP-MCNC: 0.2 MG/DL (ref 0.2–1)
VLDLC SERPL CALC-MCNC: 15 MG/DL (ref 5–40)
WBC # BLD AUTO: 6.6 X10E3/UL (ref 3.4–10.8)
WBC #/AREA URNS HPF: ABNORMAL /HPF (ref 0–5)

## 2020-05-08 NOTE — TELEPHONE ENCOUNTER
Pt returned phone call to office informed her that I will Fax over labs to [employer] as requested by Pr for another UC. Informed Pt that she was borderline prediabetic and advised her to watch her sweets and carbs intake. Pt verified understanding.

## 2020-05-08 NOTE — TELEPHONE ENCOUNTER
----- Message from Anay Ramirez sent at 5/8/2020 10:30 AM EDT -----  Regarding: Dr. Ana Maria Kern  Env Patient return call    Caller's first and last name and relationship (if not the patient):      Best contact number(s): [phone]      Whose call is being returned: Returning a missed call from the practice.        Details to clarify the request:      Anay Ramirez

## 2020-05-08 NOTE — TELEPHONE ENCOUNTER
Contact Pt to inform that I have printed off urine culture labs to be completed do to pyuria, and to also advise her to watch sweets and carbs as she is borderline prediabetic, per Dr. Jus Dubois. Left voice message for Pt to call back to the office to discuss.

## 2020-06-09 ENCOUNTER — TELEPHONE (OUTPATIENT)
Dept: FAMILY MEDICINE CLINIC | Age: 60
End: 2020-06-09

## 2020-06-09 NOTE — TELEPHONE ENCOUNTER
Pt called in and wanted her lab orders re faxed, pt supplied the fax number of [phone]. Pt was notified we would fax those right away. Pt thanked us and call was ended. Labs Faxed.

## 2020-07-06 DIAGNOSIS — I10 ESSENTIAL HYPERTENSION: ICD-10-CM

## 2020-07-07 RX ORDER — HYDROCHLOROTHIAZIDE 12.5 MG/1
TABLET ORAL
Qty: 90 TAB | Refills: 0 | OUTPATIENT
Start: 2020-07-07

## 2020-07-07 RX ORDER — HYDROCHLOROTHIAZIDE 12.5 MG/1
TABLET ORAL
Qty: 90 TAB | Refills: 0 | Status: SHIPPED | OUTPATIENT
Start: 2020-07-07 | End: 2021-01-27 | Stop reason: SDUPTHER

## 2020-07-09 DIAGNOSIS — I10 ESSENTIAL HYPERTENSION: ICD-10-CM

## 2020-07-09 RX ORDER — LOSARTAN POTASSIUM 25 MG/1
TABLET ORAL
Qty: 180 TAB | Refills: 0 | Status: SHIPPED | OUTPATIENT
Start: 2020-07-09 | End: 2021-01-27 | Stop reason: SDUPTHER

## 2020-08-01 DIAGNOSIS — I10 ESSENTIAL HYPERTENSION: ICD-10-CM

## 2020-08-03 RX ORDER — HYDROCHLOROTHIAZIDE 12.5 MG/1
TABLET ORAL
Qty: 90 TAB | Refills: 0 | OUTPATIENT
Start: 2020-08-03

## 2020-08-03 RX ORDER — LOSARTAN POTASSIUM 25 MG/1
TABLET ORAL
Qty: 180 TAB | Refills: 0 | OUTPATIENT
Start: 2020-08-03

## 2020-09-21 ENCOUNTER — HOSPITAL ENCOUNTER (OUTPATIENT)
Dept: ULTRASOUND IMAGING | Age: 60
Discharge: HOME | End: 2020-09-21
Attending: INTERNAL MEDICINE

## 2020-09-21 DIAGNOSIS — Z13.6 SCREENING FOR HEART DISEASE: ICD-10-CM

## 2020-09-21 LAB
DIST AORTA LONG DIAM: 1.5 CM
LEFT ABI: 1.17
LEFT ANTERIOR TIBIAL: 137 MMHG
LEFT ARM BP: 123 MMHG
LEFT ICA/CCA SYS: 1.6
LEFT POSTERIOR TIBIAL: 149 MMHG
MID AORTA LONG DIAM: 1.6 CM
PROX AORTA LONG DIAM: 2.1 CM
RIGHT ABI: 1.3
RIGHT ANTERIOR TIBIAL: 165 MMHG
RIGHT ARM BP: 127 MMHG
RIGHT ICA/CCA SYS: 1
RIGHT POSTERIOR TIBIAL: 160 MMHG

## 2020-09-21 PROCEDURE — 93922 UPR/L XTREMITY ART 2 LEVELS: CPT

## 2021-01-13 DIAGNOSIS — E78.00 HYPERCHOLESTEROLEMIA: ICD-10-CM

## 2021-01-13 RX ORDER — ATORVASTATIN CALCIUM 20 MG/1
20 TABLET, FILM COATED ORAL DAILY
Qty: 90 TAB | Refills: 0 | Status: SHIPPED | OUTPATIENT
Start: 2021-01-13 | End: 2021-05-11

## 2021-01-13 NOTE — TELEPHONE ENCOUNTER
Last visit 01/29/2020 MD Read   Next appointment Nothing scheduled   Previous refill encounter(s)   071/7/2020 Lipitor #90 with 1 refill     Requested Prescriptions     Pending Prescriptions Disp Refills    atorvastatin (LIPITOR) 20 mg tablet 30 Tab 0     Sig: Take 1 Tab by mouth daily.

## 2021-01-27 DIAGNOSIS — I10 ESSENTIAL HYPERTENSION: ICD-10-CM

## 2021-01-27 NOTE — TELEPHONE ENCOUNTER
Last visit 01/29/2020 MD Read   Next appointment Nothing scheduled   Previous refill encounter(s)   07/07/2020 HCTZ #90,  07/09/2020 Cozaar #180     Requested Prescriptions     Pending Prescriptions Disp Refills    hydroCHLOROthiazide (HYDRODIURIL) 12.5 mg tablet 30 Tab 0     Sig: Take 1 Tab by mouth daily.  losartan (COZAAR) 25 mg tablet 60 Tab 0     Sig: Take 2 Tabs by mouth daily.

## 2021-01-28 DIAGNOSIS — I10 ESSENTIAL HYPERTENSION: ICD-10-CM

## 2021-01-28 RX ORDER — HYDROCHLOROTHIAZIDE 12.5 MG/1
TABLET ORAL
Qty: 90 TAB | Refills: 0 | Status: CANCELLED | OUTPATIENT
Start: 2021-01-28

## 2021-01-28 RX ORDER — LOSARTAN POTASSIUM 25 MG/1
TABLET ORAL
Qty: 180 TAB | Refills: 0 | Status: CANCELLED | OUTPATIENT
Start: 2021-01-28

## 2021-01-28 NOTE — TELEPHONE ENCOUNTER
----- Message from [facility] sent at 1/28/2021  9:03 AM EST -----  Medication Refill    Caller (if not patient):N/A      Relationship of caller (if not patient):N/A      Best contact number(s):[phone]      Name of medication and dosage if known:losartan (COZAAR) 25 mg tablet, hydroCHLOROthiazide (HYDRODIURIL) 12.5 mg tablet       Is patient out of this medication (yes/no): No      Pharmacy name:[facility]    Pharmacy listed in chart? (yes/no):Yes  Pharmacy phone [phone]      Details to clarify the request:Pt states [facility] has been trying to refill X 1 wk. Pt states she will be out of meds on Sunday.       [facility]

## 2021-01-28 NOTE — TELEPHONE ENCOUNTER
PCP: Cynthia Soler MD    Last appt: Visit date not found  No future appointments. Requested Prescriptions     Pending Prescriptions Disp Refills    losartan (COZAAR) 25 mg tablet 180 Tab 0     Sig: TAKE 2 TABLETS BY MOUTH EVERY DAY    hydroCHLOROthiazide (HYDRODIURIL) 12.5 mg tablet 90 Tab 0     Sig: TAKE 1 TABLET BY MOUTH [address]    Patient has 0 days' supply of medication available.     Prior labs and Blood pressures:  BP Readings from Last 3 Encounters:   01/29/20 102/67   03/25/19 114/75   01/17/19 121/61     Lab Results   Component Value Date/Time    Sodium 141 05/07/2020 08:38 AM    Potassium 4.0 05/07/2020 08:38 AM    Chloride 102 05/07/2020 08:38 AM    CO2 25 05/07/2020 08:38 AM    Anion gap 9 01/13/2016 06:04 PM    Glucose 94 05/07/2020 08:38 AM    BUN 13 05/07/2020 08:38 AM    Creatinine 0.85 05/07/2020 08:38 AM    BUN/Creatinine ratio 15 05/07/2020 08:38 AM    GFR est AA 87 05/07/2020 08:38 AM    GFR est non-AA 75 05/07/2020 08:38 AM    Calcium 9.5 05/07/2020 08:38 AM     Lab Results   Component Value Date/Time    Hemoglobin A1c 5.9 (H) 05/07/2020 08:38 AM     Lab Results   Component Value Date/Time    Cholesterol, total 174 05/07/2020 08:38 AM    HDL Cholesterol 49 05/07/2020 08:38 AM    LDL, calculated 110 (H) 05/07/2020 08:38 AM    VLDL, calculated 15 05/07/2020 08:38 AM    Triglyceride 74 05/07/2020 08:38 AM    CHOL/HDL Ratio 4.5 07/09/2009 08:24 AM     Lab Results   Component Value Date/Time    VITAMIN D, 25-HYDROXY 45.9 05/07/2020 08:38 AM       Lab Results   Component Value Date/Time    TSH 1.680 05/07/2020 08:38 AM

## 2021-01-29 RX ORDER — LOSARTAN POTASSIUM 25 MG/1
50 TABLET ORAL DAILY
Qty: 60 TAB | Refills: 0 | Status: SHIPPED | OUTPATIENT
Start: 2021-01-29 | End: 2021-03-04 | Stop reason: SDUPTHER

## 2021-01-29 RX ORDER — HYDROCHLOROTHIAZIDE 12.5 MG/1
12.5 TABLET ORAL DAILY
Qty: 30 TAB | Refills: 0 | Status: SHIPPED | OUTPATIENT
Start: 2021-01-29 | End: 2021-03-04 | Stop reason: SDUPTHER

## 2021-01-29 NOTE — TELEPHONE ENCOUNTER
PCP: Jennings Olszewski, MD    Last appt: Visit date not found  No future appointments. Requested Prescriptions     Pending Prescriptions Disp Refills    hydroCHLOROthiazide (HYDRODIURIL) 12.5 mg tablet 30 Tab 0     Sig: Take 1 Tab by mouth daily.  losartan (COZAAR) 25 mg tablet 60 Tab 0     Sig: Take 2 Tabs by mouth daily. No visits with results within 3 Month(s) from this visit.    Latest known visit with results is:   Hospital Outpatient Visit on 09/21/2020   Component Date Value Ref Range Status    Left arm BP 09/21/2020 123  mmHg Final    Right arm BP 09/21/2020 127  mmHg Final    Left posterior tibial 09/21/2020 149  mmHg Final    Right posterior tibial 09/21/2020 160  mmHg Final    Left anterior tibial 09/21/2020 137  mmHg Final    Right anterior tibial 09/21/2020 165  mmHg Final    Left KHALIF 09/21/2020 1.17   Final    Right KHALIF 09/21/2020 1.30   Final    Right ICA/CCA sys 09/21/2020 1.00   Final    Left ICA/CCA sys 09/21/2020 1.60   Final    Prox Ao Long Diam 09/21/2020 2.10  cm Final    Mid Ao Long Diam 09/21/2020 1.60  cm Final    Dist Ao Long Diam 09/21/2020 1.50  cm Final

## 2021-03-04 DIAGNOSIS — I10 ESSENTIAL HYPERTENSION: ICD-10-CM

## 2021-03-04 RX ORDER — HYDROCHLOROTHIAZIDE 12.5 MG/1
12.5 TABLET ORAL DAILY
Qty: 30 TAB | Refills: 0 | Status: SHIPPED | OUTPATIENT
Start: 2021-03-04 | End: 2021-03-14

## 2021-03-04 RX ORDER — LOSARTAN POTASSIUM 25 MG/1
50 TABLET ORAL DAILY
Qty: 60 TAB | Refills: 0 | Status: SHIPPED | OUTPATIENT
Start: 2021-03-04 | End: 2021-03-14

## 2021-03-04 NOTE — TELEPHONE ENCOUNTER
PCP: Chanelle Núñez MD    Last appt: Visit date not found  Future Appointments   Date Time Provider Cecile Patrick   5/4/2021 10:15 AM Chanelle Núñez MD [facility]       Requested Prescriptions     Pending Prescriptions Disp Refills    hydroCHLOROthiazide (HYDRODIURIL) 12.5 mg tablet 30 Tab 0     Sig: Take 1 Tab by mouth daily. [employer] is closing, please establish care with a new provider.  losartan (COZAAR) 25 mg tablet 60 Tab 0     Sig: Take 2 Tabs by mouth daily. [employer] is closing, please establish care with a new provider.        Prior labs and Blood pressures:  BP Readings from Last 3 Encounters:   01/29/20 102/67   03/25/19 114/75   01/17/19 121/61     Lab Results   Component Value Date/Time    Sodium 141 05/07/2020 08:38 AM    Potassium 4.0 05/07/2020 08:38 AM    Chloride 102 05/07/2020 08:38 AM    CO2 25 05/07/2020 08:38 AM    Anion gap 9 01/13/2016 06:04 PM    Glucose 94 05/07/2020 08:38 AM    BUN 13 05/07/2020 08:38 AM    Creatinine 0.85 05/07/2020 08:38 AM    BUN/Creatinine ratio 15 05/07/2020 08:38 AM    GFR est AA 87 05/07/2020 08:38 AM    GFR est non-AA 75 05/07/2020 08:38 AM    Calcium 9.5 05/07/2020 08:38 AM     Lab Results   Component Value Date/Time    Hemoglobin A1c 5.9 (H) 05/07/2020 08:38 AM     Lab Results   Component Value Date/Time    Cholesterol, total 174 05/07/2020 08:38 AM    HDL Cholesterol 49 05/07/2020 08:38 AM    LDL, calculated 110 (H) 05/07/2020 08:38 AM    VLDL, calculated 15 05/07/2020 08:38 AM    Triglyceride 74 05/07/2020 08:38 AM    CHOL/HDL Ratio 4.5 07/09/2009 08:24 AM     Lab Results   Component Value Date/Time    VITAMIN D, 25-HYDROXY 45.9 05/07/2020 08:38 AM       Lab Results   Component Value Date/Time    TSH 1.680 05/07/2020 08:38 AM

## 2021-03-11 DIAGNOSIS — I10 ESSENTIAL HYPERTENSION: ICD-10-CM

## 2021-03-14 RX ORDER — HYDROCHLOROTHIAZIDE 12.5 MG/1
TABLET ORAL
Qty: 90 TAB | Refills: 1 | Status: SHIPPED | OUTPATIENT
Start: 2021-03-14 | End: 2021-03-28

## 2021-03-14 RX ORDER — LOSARTAN POTASSIUM 25 MG/1
TABLET ORAL
Qty: 180 TAB | Refills: 1 | Status: SHIPPED | OUTPATIENT
Start: 2021-03-14 | End: 2021-03-28

## 2021-03-27 DIAGNOSIS — I10 ESSENTIAL HYPERTENSION: ICD-10-CM

## 2021-03-28 RX ORDER — LOSARTAN POTASSIUM 25 MG/1
TABLET ORAL
Qty: 60 TAB | Refills: 0 | Status: SHIPPED | OUTPATIENT
Start: 2021-03-28 | End: 2021-04-20

## 2021-03-28 RX ORDER — HYDROCHLOROTHIAZIDE 12.5 MG/1
TABLET ORAL
Qty: 30 TAB | Refills: 0 | Status: SHIPPED | OUTPATIENT
Start: 2021-03-28 | End: 2021-04-20

## 2021-04-20 DIAGNOSIS — I10 ESSENTIAL HYPERTENSION: ICD-10-CM

## 2021-04-20 RX ORDER — LOSARTAN POTASSIUM 25 MG/1
TABLET ORAL
Qty: 60 TAB | Refills: 0 | Status: SHIPPED | OUTPATIENT
Start: 2021-04-20 | End: 2021-06-03 | Stop reason: SDUPTHER

## 2021-04-20 RX ORDER — HYDROCHLOROTHIAZIDE 12.5 MG/1
TABLET ORAL
Qty: 30 TAB | Refills: 0 | Status: SHIPPED | OUTPATIENT
Start: 2021-04-20 | End: 2021-05-10 | Stop reason: SDUPTHER

## 2021-05-10 ENCOUNTER — OFFICE VISIT (OUTPATIENT)
Dept: INTERNAL MEDICINE CLINIC | Age: 61
End: 2021-05-10
Payer: COMMERCIAL

## 2021-05-10 VITALS
OXYGEN SATURATION: 97 % | WEIGHT: 186 LBS | RESPIRATION RATE: 12 BRPM | DIASTOLIC BLOOD PRESSURE: 80 MMHG | HEART RATE: 97 BPM | TEMPERATURE: 97.6 F | SYSTOLIC BLOOD PRESSURE: 114 MMHG | BODY MASS INDEX: 31.76 KG/M2 | HEIGHT: 64 IN

## 2021-05-10 DIAGNOSIS — I10 ESSENTIAL HYPERTENSION: Primary | ICD-10-CM

## 2021-05-10 DIAGNOSIS — E78.00 HYPERCHOLESTEROLEMIA: ICD-10-CM

## 2021-05-10 DIAGNOSIS — R73.02 IMPAIRED GLUCOSE TOLERANCE: ICD-10-CM

## 2021-05-10 PROCEDURE — 99204 OFFICE O/P NEW MOD 45 MIN: CPT | Performed by: INTERNAL MEDICINE

## 2021-05-10 RX ORDER — HYDROCHLOROTHIAZIDE 12.5 MG/1
TABLET ORAL
Qty: 90 TAB | Refills: 3 | Status: SHIPPED | OUTPATIENT
Start: 2021-05-10 | End: 2022-06-01 | Stop reason: SDUPTHER

## 2021-05-10 NOTE — PROGRESS NOTES
Ratna Nick is a 61 y.o. female presenting for [facility]  . 1. Have you been to the ER, urgent care clinic since your last visit? Hospitalized since your last visit? No    2. Have you seen or consulted any other health care providers outside of the [address] [place] [address] since your last visit? Include any pap smears or colon screening. No    Fall Risk Assessment, last 12 mths 5/10/2021   Able to walk? Yes   Fall in past 12 months? 0   Do you feel unsteady? 0   Are you worried about falling 0         Abuse Screening Questionnaire 5/10/2021   Do you ever feel afraid of your partner? N   Are you in a relationship with someone who physically or mentally threatens you? N   Is it safe for you to go home? Y       3 most recent PHQ Screens 5/10/2021   Little interest or pleasure in doing things Not at all   Feeling down, depressed, irritable, or hopeless Not at all   Total Score PHQ 2 0       There are no discontinued medications.

## 2021-05-10 NOTE — PATIENT INSTRUCTIONS
High Blood Pressure: Care Instructions Overview It's normal for blood pressure to go up and down throughout the day. But if it stays up, you have high blood pressure. Another name for high blood pressure is hypertension. Despite what a lot of people think, high blood pressure usually doesn't cause headaches or make you feel dizzy or lightheaded. It usually has no symptoms. But it does increase your risk of stroke, heart attack, and other problems. You and your doctor will talk about your risks of these problems based on your blood pressure. Your doctor will give you a goal for your blood pressure. Your goal will be based on your health and your age. Lifestyle changes, such as eating healthy and being active, are always important to help lower blood pressure. You might also take medicine to reach your blood pressure goal. 
Follow-up care is a key part of your treatment and safety. Be sure to make and go to all appointments, and call your doctor if you are having problems. It's also a good idea to know your test results and keep a list of the medicines you take. How can you care for yourself at home? Medical treatment · If you stop taking your medicine, your blood pressure will go back up. You may take one or more types of medicine to lower your blood pressure. Be safe with medicines. Take your medicine exactly as prescribed. Call your doctor if you think you are having a problem with your medicine. · Talk to your doctor before you start taking aspirin every day. Aspirin can help certain people lower their risk of a heart attack or stroke. But taking aspirin isn't right for everyone, because it can cause serious bleeding. · See your doctor regularly. You may need to see the doctor more often at first or until your blood pressure comes down. · If you are taking blood pressure medicine, talk to your doctor before you take decongestants or anti-inflammatory medicine, such as ibuprofen.  Some of these medicines can raise blood pressure. · Learn how to check your blood pressure at home. Lifestyle changes · Stay at a healthy weight. This is especially important if you put on weight around the waist. Losing even 10 pounds can help you lower your blood pressure. · If your doctor recommends it, get more exercise. Walking is a good choice. Bit by bit, increase the amount you walk every day. Try for at least 30 minutes on most days of the week. You also may want to swim, bike, or do other activities. · Avoid or limit alcohol. Talk to your doctor about whether you can drink any alcohol. · Try to limit how much sodium you eat to less than 2,300 milligrams (mg) a day. Your doctor may ask you to try to eat less than 1,500 mg a day. · Eat plenty of fruits (such as bananas and oranges), vegetables, legumes, whole grains, and low-fat dairy products. · Lower the amount of saturated fat in your diet. Saturated fat is found in animal products such as milk, cheese, and meat. Limiting these foods may help you lose weight and also lower your risk for heart disease. · Do not smoke. Smoking increases your risk for heart attack and stroke. If you need help quitting, talk to your doctor about stop-smoking programs and medicines. These can increase your chances of quitting for good. When should you call for help? Call  911 anytime you think you may need emergency care. This may mean having symptoms that suggest that your blood pressure is causing a serious heart or blood vessel problem. Your blood pressure may be over 180/120. For example, call 911 if: 
  · You have symptoms of a heart attack. These may include: 
? Chest pain or pressure, or a strange feeling in the chest. 
? Sweating. ? Shortness of breath. ? Nausea or vomiting. ? Pain, pressure, or a strange feeling in the back, neck, jaw, or upper belly or in one or both shoulders or arms. ? Lightheadedness or sudden weakness.  
? A fast or irregular heartbeat.  
  · You have symptoms of a stroke. These may include: 
? Sudden numbness, tingling, weakness, or loss of movement in your face, arm, or leg, especially on only one side of your body. ? Sudden vision changes. ? Sudden trouble speaking. ? Sudden confusion or trouble understanding simple statements. ? Sudden problems with walking or balance. ? A sudden, severe headache that is different from past headaches.  
  · You have severe back or belly pain. Do not wait until your blood pressure comes down on its own. Get help right away. Call your doctor now or seek immediate care if: 
  · Your blood pressure is much higher than normal (such as 180/120 or higher), but you don't have symptoms.  
  · You think high blood pressure is causing symptoms, such as: 
? Severe headache. 
? Blurry vision. Watch closely for changes in your health, and be sure to contact your doctor if: 
  · Your blood pressure measures higher than your doctor recommends at least 2 times. That means the top number is higher or the bottom number is higher, or both.  
  · You think you may be having side effects from your blood pressure medicine. Where can you learn more? Go to http://www.gray.com/ Enter J904 in the search box to learn more about \"High Blood Pressure: Care Instructions. \" Current as of: August 31, 2020               Content Version: 12.8 © 2006-2021 [employer]. Care instructions adapted under license by [employer] (which disclaims liability or warranty for this information). If you have questions about a medical condition or this instruction, always ask your healthcare professional. [place] [address] any warranty or liability for your use of this information.

## 2021-05-10 NOTE — PROGRESS NOTES
Remi Dominguez is a 61 y.o. female and presents with Establish Care      Subjective:  Patient comes in today to establish care. She is a new patient to our practice. Former patient of Dr. Eduardo Shoemaker. 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 Hypertensionwell-controlled on losartan and hydrochlorothiazide. Denies headaches or blurred vision. Hyperlipidemiaon Lipitor. Denies muscle cramps or myalgias. Prediabetes, last A1c 5.9. Diet controlled. Patient reports she had a history of total hysterectomy, bilateral BSO at the age of 36 due to dysfunctional uterine bleeding. Mammogramunremarkable per patient. Done a month back. Colonoscopy was done 9 years back which was unremarkable. She is due for one next year. Past Medical History:   Diagnosis Date    Acid reflux     Advance directive discussed with patient 05/18/2015    Anemia NEC     Back pain 9/15/15    [facility] notes    Cyst     History of chicken pox     Hypercholesterolemia     Hypertension     no treated at this time    Redundant colon 5/2012    on scope sameer frances    Screening for HPV (human papillomavirus) 1/16/15    Dr Toya Richards positive PAP repeat due 1/2016     Past Surgical History:   Procedure Laterality Date    CT HEART W/O CONT WITH CALCIUM  5/2008    zero    HX ADENOIDECTOMY      HX HYSTERECTOMY      Hyst for DUB    HX TONSILLECTOMY      t and a    [facility] COLONOSCOPY FLX DX W/COLLJ SPEC WHEN PFRMD  5/7/2012     with barium enema follow up redundant     Allergies   Allergen Reactions    Enalapril Cough     Other reaction(s): Other (See Comments)    Nifedipine Other (comments)     headache  Other reaction(s): Headache  Other reaction(s): Other (See Comments)  headache  Other reaction(s): Headache     Current Outpatient Medications   Medication Sig Dispense Refill    hydroCHLOROthiazide (HYDRODIURIL) 12.5 mg tablet TAKE 1 TAB BY MOUTH DAILY.  [facility] IS CLOSING, PLEASE ESTABLISH CARE WITH A NEW PROVIDER. 90 Tab 3    losartan (COZAAR) 25 mg tablet TAKE 2 TABS BY MOUTH DAILY 60 Tab 0    atorvastatin (LIPITOR) 20 mg tablet Take 1 Tab by mouth daily. 90 Tab 0    fish oil-omega-3 fatty acids (FISH OIL) 340-1,000 mg capsule Take 1 Cap by mouth.  OTHER 1 Cap daily.  B-Complex #12      OTHER Vit D 3 2000 iu daily       Social History     Socioeconomic History    Marital status:      Spouse name: Not on file    Number of children: Not on file    Years of education: Not on file    Highest education level: Not on file   Tobacco Use    Smoking status: Never Smoker    Smokeless tobacco: Never Used   Substance and Sexual Activity    Alcohol use: No    Drug use: No    Sexual activity: Not Currently     Family History   Problem Relation Age of Onset    Cancer Father         lung    Hypertension Father    [facility] Elevated Lipids Father     Cancer Paternal Uncle         colon    Heart Disease Maternal Grandmother     Cancer Paternal Grandfather         lung    Cancer Paternal Uncle         colon    Heart Disease Mother     Hypertension Mother     Elevated Lipids Mother     Diabetes Sister     Hypertension Sister     Hypertension Brother     Elevated Lipids Brother     Cancer Paternal Aunt         stomach    Cancer Paternal Aunt         stomach    Cancer Paternal Aunt         stomach    Cancer Paternal Aunt         stomach    Lung Disease Paternal Uncle     Lung Disease Paternal Uncle     Other Sister        Objective:  Visit Vitals  /80 (BP 1 Location: Left upper arm, BP Patient Position: Sitting, BP Cuff Size: Adult)   Pulse 97   Temp 97.6 °F (36.4 °C)   Resp 12   Ht 5' 4.37\" (1.635 m)   Wt 186 lb (84.4 kg)   LMP  (LMP Unknown)   SpO2 97%   BMI 31.56 kg/m²     Physical Exam:   General appearance - alert, well appearing, and in no distress  Mental status - alert, oriented to person, place, and time  EYE-DARYN, EOMI, fundi normal, corneas normal, no foreign bodies  ENT-ENT exam normal, no neck nodes or sinus tenderness  Nose - normal and patent, no erythema, discharge or polyps  Mouth - mucous membranes moist, pharynx normal without lesions  Neck - supple, no significant adenopathy   Chest - clear to auscultation, no wheezes, rales or rhonchi, symmetric air entry   Heart - normal rate, regular rhythm, normal S1, S2, no murmurs, rubs, clicks or gallops   Abdomen - soft, nontender, nondistended, no masses or organomegaly  Lymph- no adenopathy palpable  Ext-peripheral pulses normal, no pedal edema, no clubbing or cyanosis  Skin-Warm and dry. no hyperpigmentation, vitiligo, or suspicious lesions  Neuro -alert, oriented, normal speech, no focal findings or movement disorder noted  Musculoskeletal- FROM, no bony abnormalities, no point tenderness    Lab Review:  Results for orders placed or performed during the hospital encounter of 09/21/20   DUPLEX US VASCULAR SCREENING   Result Value Ref Range    Left arm  mmHg    Right arm  mmHg    Left posterior tibial 149 mmHg    Right posterior tibial 160 mmHg    Left anterior tibial 137 mmHg    Right anterior tibial 165 mmHg    Left KHALIF 1.17     Right KHALIF 1.30     Right ICA/CCA sys 1.00     Left ICA/CCA sys 1.60     Prox Ao Long Diam 2.10 cm    Mid Ao Long Diam 1.60 cm    Dist Ao Long Diam 1.50 cm        Documenation Review:    Assessment/Plan:    Diagnoses and all orders for this visit:    1. Essential hypertension  -     CBC W/O DIFF; Future  -     METABOLIC PANEL, COMPREHENSIVE; Future  -     hydroCHLOROthiazide (HYDRODIURIL) 12.5 mg tablet; TAKE 1 TAB BY MOUTH DAILY. [facility] IS CLOSING, PLEASE ESTABLISH CARE WITH A NEW PROVIDER. 2. Hypercholesterolemia  -     LIPID PANEL; Future    3. Impaired glucose tolerance  -     HEMOGLOBIN A1C WITH EAG; Future        Continue current meds.   I will call with lab results and make further recommendations or adjustments if necessary. Discussed lifestyle modifications including Na restriction, low carb/fat diet, weight reduction and exercise (at least a walking program). ICD-10-CM ICD-9-CM    1. Essential hypertension  I10 401.9 CBC W/O DIFF      METABOLIC PANEL, COMPREHENSIVE      hydroCHLOROthiazide (HYDRODIURIL) 12.5 mg tablet   2. Hypercholesterolemia  E78.00 272.0 LIPID PANEL   3. Impaired glucose tolerance  R73.02 790.22 HEMOGLOBIN A1C WITH EAG           Follow-up and Dispositions    · Return in about 6 months (around 11/10/2021) for CPE-30mins. I have reviewed with the patient details of the assessment and plan and all questions were answered. Relevent patient education was performed. Verbal and/or written instructions (see AVS) provided. The most recent lab findings were reviewed with the patient. Plan was discussed with patient who verbally expressed understanding. An After Visit Summary was printed and given to the patient.     Michelle Mahan MD

## 2021-05-11 DIAGNOSIS — E78.00 HYPERCHOLESTEROLEMIA: ICD-10-CM

## 2021-05-11 LAB
ALBUMIN SERPL-MCNC: 3.8 G/DL (ref 3.5–5)
ALBUMIN/GLOB SERPL: 1 {RATIO} (ref 1.1–2.2)
ALP SERPL-CCNC: 92 U/L (ref 45–117)
ALT SERPL-CCNC: 28 U/L (ref 12–78)
ANION GAP SERPL CALC-SCNC: 5 MMOL/L (ref 5–15)
AST SERPL-CCNC: 19 U/L (ref 15–37)
BILIRUB SERPL-MCNC: 0.2 MG/DL (ref 0.2–1)
BUN SERPL-MCNC: 16 MG/DL (ref 6–20)
BUN/CREAT SERPL: 23 (ref 12–20)
CALCIUM SERPL-MCNC: 9.3 MG/DL (ref 8.5–10.1)
CHLORIDE SERPL-SCNC: 106 MMOL/L (ref 97–108)
CHOLEST SERPL-MCNC: 279 MG/DL
CO2 SERPL-SCNC: 30 MMOL/L (ref 21–32)
CREAT SERPL-MCNC: 0.71 MG/DL (ref 0.55–1.02)
ERYTHROCYTE [DISTWIDTH] IN BLOOD BY AUTOMATED COUNT: 14.7 % (ref 11.5–14.5)
EST. AVERAGE GLUCOSE BLD GHB EST-MCNC: 123 MG/DL
GLOBULIN SER CALC-MCNC: 3.9 G/DL (ref 2–4)
GLUCOSE SERPL-MCNC: 100 MG/DL (ref 65–100)
HBA1C MFR BLD: 5.9 % (ref 4–5.6)
HCT VFR BLD AUTO: 37.5 % (ref 35–47)
HDLC SERPL-MCNC: 51 MG/DL
HDLC SERPL: 5.5 {RATIO} (ref 0–5)
HGB BLD-MCNC: 11.6 G/DL (ref 11.5–16)
LDLC SERPL CALC-MCNC: 199.6 MG/DL (ref 0–100)
LIPID PROFILE,FLP: ABNORMAL
MCH RBC QN AUTO: 26.7 PG (ref 26–34)
MCHC RBC AUTO-ENTMCNC: 30.9 G/DL (ref 30–36.5)
MCV RBC AUTO: 86.2 FL (ref 80–99)
NRBC # BLD: 0 K/UL (ref 0–0.01)
NRBC BLD-RTO: 0 PER 100 WBC
PLATELET # BLD AUTO: 253 K/UL (ref 150–400)
PMV BLD AUTO: 10.5 FL (ref 8.9–12.9)
POTASSIUM SERPL-SCNC: 3.7 MMOL/L (ref 3.5–5.1)
PROT SERPL-MCNC: 7.7 G/DL (ref 6.4–8.2)
RBC # BLD AUTO: 4.35 M/UL (ref 3.8–5.2)
SODIUM SERPL-SCNC: 141 MMOL/L (ref 136–145)
TRIGL SERPL-MCNC: 142 MG/DL (ref ?–150)
VLDLC SERPL CALC-MCNC: 28.4 MG/DL
WBC # BLD AUTO: 7.7 K/UL (ref 3.6–11)

## 2021-05-11 RX ORDER — ATORVASTATIN CALCIUM 40 MG/1
40 TABLET, FILM COATED ORAL DAILY
Qty: 180 TAB | Refills: 1 | Status: SHIPPED | OUTPATIENT
Start: 2021-05-11 | End: 2021-08-09

## 2021-05-11 NOTE — PROGRESS NOTES
A1c 5.9 at prediabetic range. LDL/bad cholesterol is concerningly high. She is on a low dose of Lipitor. Would like to increase it to 40 mg. Would like to repeat lipid panel in 3 months. Please make follow-up appointment with patient. We will place order of CT heart scan to look for calcium scoringany blockages in coronary arteries.   Please notify patient

## 2021-05-13 ENCOUNTER — TELEPHONE (OUTPATIENT)
Dept: INTERNAL MEDICINE CLINIC | Age: 61
End: 2021-05-13

## 2021-05-13 DIAGNOSIS — R73.02 IMPAIRED GLUCOSE TOLERANCE: ICD-10-CM

## 2021-05-13 DIAGNOSIS — E78.00 HYPERCHOLESTEROLEMIA: ICD-10-CM

## 2021-05-13 DIAGNOSIS — I10 ESSENTIAL HYPERTENSION: Primary | ICD-10-CM

## 2021-05-13 NOTE — TELEPHONE ENCOUNTER
----- Message from Dale Benavidez MD sent at 5/11/2021 10:33 AM EDT -----  A1c 5.9 at prediabetic range. LDL/bad cholesterol is concerningly high. She is on a low dose of Lipitor. Would like to increase it to 40 mg. Would like to repeat lipid panel in 3 months. Please make follow-up appointment with patient. We will place order of CT heart scan to look for calcium scoringany blockages in coronary arteries.   Please notify patient

## 2021-06-03 DIAGNOSIS — I10 ESSENTIAL HYPERTENSION: ICD-10-CM

## 2021-06-03 RX ORDER — LOSARTAN POTASSIUM 25 MG/1
TABLET ORAL
Qty: 60 TABLET | Refills: 0 | Status: SHIPPED | OUTPATIENT
Start: 2021-06-03 | End: 2021-06-27

## 2021-06-03 NOTE — TELEPHONE ENCOUNTER
Last Refill: 4/20/2021  Last Visit: 5/10/2021   Next Visit: 11/10/2021    Requested Prescriptions     Pending Prescriptions Disp Refills    losartan (COZAAR) 25 mg tablet 60 Tablet 0     Sig: TAKE 2 TABS BY MOUTH DAILY

## 2021-06-26 DIAGNOSIS — I10 ESSENTIAL HYPERTENSION: ICD-10-CM

## 2021-06-27 RX ORDER — LOSARTAN POTASSIUM 25 MG/1
TABLET ORAL
Qty: 60 TABLET | Refills: 0 | Status: SHIPPED | OUTPATIENT
Start: 2021-06-27 | End: 2021-07-25

## 2021-07-25 DIAGNOSIS — I10 ESSENTIAL HYPERTENSION: ICD-10-CM

## 2021-07-25 RX ORDER — LOSARTAN POTASSIUM 25 MG/1
TABLET ORAL
Qty: 60 TABLET | Refills: 0 | Status: SHIPPED | OUTPATIENT
Start: 2021-07-25 | End: 2021-08-19

## 2021-08-19 DIAGNOSIS — I10 ESSENTIAL HYPERTENSION: ICD-10-CM

## 2021-08-19 RX ORDER — LOSARTAN POTASSIUM 25 MG/1
TABLET ORAL
Qty: 60 TABLET | Refills: 0 | Status: SHIPPED | OUTPATIENT
Start: 2021-08-19 | End: 2021-09-23

## 2021-09-20 DIAGNOSIS — I10 ESSENTIAL HYPERTENSION: ICD-10-CM

## 2021-09-23 RX ORDER — LOSARTAN POTASSIUM 25 MG/1
TABLET ORAL
Qty: 60 TABLET | Refills: 0 | Status: SHIPPED | OUTPATIENT
Start: 2021-09-23 | End: 2021-10-21

## 2021-10-21 DIAGNOSIS — I10 ESSENTIAL HYPERTENSION: ICD-10-CM

## 2021-10-21 RX ORDER — LOSARTAN POTASSIUM 25 MG/1
TABLET ORAL
Qty: 60 TABLET | Refills: 5 | Status: SHIPPED | OUTPATIENT
Start: 2021-10-21 | End: 2022-04-25 | Stop reason: SDUPTHER

## 2022-03-19 PROBLEM — Z90.710 HISTORY OF TOTAL HYSTERECTOMY: Status: ACTIVE | Noted: 2018-04-05

## 2022-03-20 PROBLEM — R73.02 IMPAIRED GLUCOSE TOLERANCE: Status: ACTIVE | Noted: 2018-04-05

## 2022-04-25 DIAGNOSIS — I10 ESSENTIAL HYPERTENSION: ICD-10-CM

## 2022-04-25 RX ORDER — LOSARTAN POTASSIUM 25 MG/1
TABLET ORAL
Qty: 60 TABLET | Refills: 0 | Status: SHIPPED | OUTPATIENT
Start: 2022-04-25 | End: 2022-06-03

## 2022-04-25 NOTE — TELEPHONE ENCOUNTER
RX refill request from the patient/pharmacy. Patient last seen 05- with labs, and does not have a f/up appointment.   Requested Prescriptions     Pending Prescriptions Disp Refills    losartan (COZAAR) 25 mg tablet 60 Tablet 0     Sig: TAKE 2 TABLETS BY MOUTH EVERY DAY

## 2022-04-25 NOTE — TELEPHONE ENCOUNTER
Last Refill: 1/25/22  Last Visit: 5/10/21 Dr. Rhea Velazco  Next Visit: no follow up appt      Requested Prescriptions     Pending Prescriptions Disp Refills    losartan (COZAAR) 25 mg tablet 60 Tablet 0     Sig: TAKE 2 TABLETS BY MOUTH EVERY DAY

## 2022-06-01 DIAGNOSIS — I10 ESSENTIAL HYPERTENSION: ICD-10-CM

## 2022-06-01 RX ORDER — HYDROCHLOROTHIAZIDE 12.5 MG/1
TABLET ORAL
Qty: 90 TABLET | Refills: 3 | Status: SHIPPED | OUTPATIENT
Start: 2022-06-01

## 2022-06-01 NOTE — TELEPHONE ENCOUNTER
PCP: Monique Ferrer MD    Last appt: 5/10/2021  No future appointments. Requested Prescriptions     Pending Prescriptions Disp Refills    hydroCHLOROthiazide (HYDRODIURIL) 12.5 mg tablet 90 Tablet 3     Sig: TAKE 1 TAB BY MOUTH DAILY. [facility] IS CLOSING, PLEASE ESTABLISH CARE WITH A NEW PROVIDER.          Other Comments:

## 2022-06-03 DIAGNOSIS — I10 ESSENTIAL HYPERTENSION: ICD-10-CM

## 2022-06-03 RX ORDER — LOSARTAN POTASSIUM 25 MG/1
TABLET ORAL
Qty: 60 TABLET | Refills: 0 | Status: SHIPPED | OUTPATIENT
Start: 2022-06-03

## 2022-06-03 NOTE — TELEPHONE ENCOUNTER
RX refill request from the patient/pharmacy. Patient last seen 05- with labs, and does not have a f/up appointment.   Requested Prescriptions     Pending Prescriptions Disp Refills    losartan (COZAAR) 25 mg tablet [Pharmacy Med Name: LOSARTAN POTASSIUM 25 MG TAB] 60 Tablet 0     Sig: TAKE 2 TABLETS BY MOUTH EVERY DAY